# Patient Record
Sex: FEMALE | Race: WHITE | NOT HISPANIC OR LATINO | Employment: OTHER | URBAN - METROPOLITAN AREA
[De-identification: names, ages, dates, MRNs, and addresses within clinical notes are randomized per-mention and may not be internally consistent; named-entity substitution may affect disease eponyms.]

---

## 2021-08-20 ENCOUNTER — APPOINTMENT (OUTPATIENT)
Dept: RADIOLOGY | Facility: MEDICAL CENTER | Age: 76
End: 2021-08-20
Attending: EMERGENCY MEDICINE
Payer: MEDICARE

## 2021-08-20 ENCOUNTER — HOSPITAL ENCOUNTER (EMERGENCY)
Facility: MEDICAL CENTER | Age: 76
End: 2021-08-21
Attending: EMERGENCY MEDICINE
Payer: MEDICARE

## 2021-08-20 VITALS
OXYGEN SATURATION: 95 % | RESPIRATION RATE: 16 BRPM | SYSTOLIC BLOOD PRESSURE: 135 MMHG | WEIGHT: 142 LBS | BODY MASS INDEX: 23.66 KG/M2 | HEART RATE: 77 BPM | HEIGHT: 65 IN | TEMPERATURE: 98 F | DIASTOLIC BLOOD PRESSURE: 90 MMHG

## 2021-08-20 DIAGNOSIS — R19.00 PELVIC MASS IN FEMALE: ICD-10-CM

## 2021-08-20 DIAGNOSIS — R10.84 GENERALIZED ABDOMINAL PAIN: ICD-10-CM

## 2021-08-20 LAB
ALBUMIN SERPL BCP-MCNC: 4.1 G/DL (ref 3.2–4.9)
ALBUMIN/GLOB SERPL: 1.4 G/DL
ALP SERPL-CCNC: 105 U/L (ref 30–99)
ALT SERPL-CCNC: 11 U/L (ref 2–50)
ANION GAP SERPL CALC-SCNC: 13 MMOL/L (ref 7–16)
AST SERPL-CCNC: 15 U/L (ref 12–45)
BASOPHILS # BLD AUTO: 0.6 % (ref 0–1.8)
BASOPHILS # BLD: 0.09 K/UL (ref 0–0.12)
BILIRUB SERPL-MCNC: <0.2 MG/DL (ref 0.1–1.5)
BUN SERPL-MCNC: 15 MG/DL (ref 8–22)
CALCIUM SERPL-MCNC: 9.4 MG/DL (ref 8.5–10.5)
CHLORIDE SERPL-SCNC: 107 MMOL/L (ref 96–112)
CO2 SERPL-SCNC: 22 MMOL/L (ref 20–33)
CREAT SERPL-MCNC: 0.72 MG/DL (ref 0.5–1.4)
EOSINOPHIL # BLD AUTO: 0.39 K/UL (ref 0–0.51)
EOSINOPHIL NFR BLD: 2.7 % (ref 0–6.9)
ERYTHROCYTE [DISTWIDTH] IN BLOOD BY AUTOMATED COUNT: 50.2 FL (ref 35.9–50)
GLOBULIN SER CALC-MCNC: 3 G/DL (ref 1.9–3.5)
GLUCOSE SERPL-MCNC: 108 MG/DL (ref 65–99)
HCT VFR BLD AUTO: 43.6 % (ref 37–47)
HGB BLD-MCNC: 14.5 G/DL (ref 12–16)
IMM GRANULOCYTES # BLD AUTO: 0.1 K/UL (ref 0–0.11)
IMM GRANULOCYTES NFR BLD AUTO: 0.7 % (ref 0–0.9)
LIPASE SERPL-CCNC: 21 U/L (ref 11–82)
LYMPHOCYTES # BLD AUTO: 3.57 K/UL (ref 1–4.8)
LYMPHOCYTES NFR BLD: 24.9 % (ref 22–41)
MCH RBC QN AUTO: 31.7 PG (ref 27–33)
MCHC RBC AUTO-ENTMCNC: 33.3 G/DL (ref 33.6–35)
MCV RBC AUTO: 95.2 FL (ref 81.4–97.8)
MONOCYTES # BLD AUTO: 0.85 K/UL (ref 0–0.85)
MONOCYTES NFR BLD AUTO: 5.9 % (ref 0–13.4)
NEUTROPHILS # BLD AUTO: 9.34 K/UL (ref 2–7.15)
NEUTROPHILS NFR BLD: 65.2 % (ref 44–72)
NRBC # BLD AUTO: 0 K/UL
NRBC BLD-RTO: 0 /100 WBC
PLATELET # BLD AUTO: 333 K/UL (ref 164–446)
PMV BLD AUTO: 9.2 FL (ref 9–12.9)
POTASSIUM SERPL-SCNC: 3.5 MMOL/L (ref 3.6–5.5)
PROT SERPL-MCNC: 7.1 G/DL (ref 6–8.2)
RBC # BLD AUTO: 4.58 M/UL (ref 4.2–5.4)
SODIUM SERPL-SCNC: 142 MMOL/L (ref 135–145)
WBC # BLD AUTO: 14.3 K/UL (ref 4.8–10.8)

## 2021-08-20 PROCEDURE — 80053 COMPREHEN METABOLIC PANEL: CPT

## 2021-08-20 PROCEDURE — 99284 EMERGENCY DEPT VISIT MOD MDM: CPT

## 2021-08-20 PROCEDURE — 85025 COMPLETE CBC W/AUTO DIFF WBC: CPT

## 2021-08-20 PROCEDURE — 96372 THER/PROPH/DIAG INJ SC/IM: CPT

## 2021-08-20 PROCEDURE — 83690 ASSAY OF LIPASE: CPT

## 2021-08-20 RX ORDER — DICYCLOMINE HYDROCHLORIDE 10 MG/ML
20 INJECTION INTRAMUSCULAR ONCE
Status: COMPLETED | OUTPATIENT
Start: 2021-08-21 | End: 2021-08-21

## 2021-08-21 ENCOUNTER — APPOINTMENT (OUTPATIENT)
Dept: RADIOLOGY | Facility: MEDICAL CENTER | Age: 76
End: 2021-08-21
Attending: EMERGENCY MEDICINE
Payer: MEDICARE

## 2021-08-21 PROCEDURE — 74177 CT ABD & PELVIS W/CONTRAST: CPT

## 2021-08-21 PROCEDURE — 700117 HCHG RX CONTRAST REV CODE 255: Performed by: EMERGENCY MEDICINE

## 2021-08-21 PROCEDURE — 700111 HCHG RX REV CODE 636 W/ 250 OVERRIDE (IP): Performed by: EMERGENCY MEDICINE

## 2021-08-21 PROCEDURE — 96372 THER/PROPH/DIAG INJ SC/IM: CPT

## 2021-08-21 PROCEDURE — 700102 HCHG RX REV CODE 250 W/ 637 OVERRIDE(OP): Performed by: EMERGENCY MEDICINE

## 2021-08-21 PROCEDURE — A9270 NON-COVERED ITEM OR SERVICE: HCPCS | Performed by: EMERGENCY MEDICINE

## 2021-08-21 RX ORDER — AMOXICILLIN 250 MG
1 CAPSULE ORAL DAILY
Qty: 90 TABLET | Refills: 0 | Status: SHIPPED | OUTPATIENT
Start: 2021-08-21 | End: 2022-11-15

## 2021-08-21 RX ORDER — POLYETHYLENE GLYCOL 3350 17 G/17G
17 POWDER, FOR SOLUTION ORAL DAILY
Qty: 3 EACH | Refills: 0 | Status: SHIPPED | OUTPATIENT
Start: 2021-08-21 | End: 2021-08-24

## 2021-08-21 RX ADMIN — LIDOCAINE HYDROCHLORIDE 30 ML: 20 SOLUTION OROPHARYNGEAL at 01:59

## 2021-08-21 RX ADMIN — DICYCLOMINE HYDROCHLORIDE 20 MG: 20 INJECTION, SOLUTION INTRAMUSCULAR at 02:00

## 2021-08-21 RX ADMIN — IOHEXOL 100 ML: 350 INJECTION, SOLUTION INTRAVENOUS at 01:22

## 2021-08-21 NOTE — DISCHARGE INSTRUCTIONS
Your abdominal pain seems to be from constipation.  This can definitely cause pain after eating.  Use the medications that we have prescribed, and schedule follow-up with your primary care doctor to discuss your symptoms and this emergency department visit.    Although probably unrelated to your pain, there is a lump on your uterus or ovary on the left.  We have put a referral in our computer system to gynecology to further evaluate this lump.  Our schedulers will contact you to help make sure you have an appointment arranged.  You can also use the contact information listed here to schedule your own appointment and confirm your referral.

## 2021-08-21 NOTE — ED PROVIDER NOTES
ED Provider Note    Scribed for Omar Sky M.D. by Omar Sky M.D.. 8/20/2021,  11:15 PM.    CHIEF COMPLAINT  Chief Complaint   Patient presents with   • Abdominal Pain   • N/V       HPI  Remy Gallegos is a 76 y.o. female who presents to the Emergency Department for acute on chronic postprandial diffuse abdominal pain.  She actually denies nausea and vomiting usually, though vomited tonight.  She says that this pain has been happening intermittently for 2 years.  She has a primary care doctor who she last saw 4 months ago, but has never discussed this problem with her primary care doctor or seen a gastroenterologist or taken any medications at home for her symptoms.  She cannot identify any specific foods that trigger her symptoms.  She says that she sometimes feels better after she has a bowel movement.  She says that she can never tell when this pain is going to happen.  She reports that she was told at another hospital nearby that she had colitis.  This was about a month ago, and she was treated with antibiotics.  This treatment did not make her feel better, she does not think.  She has not had fevers or chills, chest pain or cough or shortness of breath.  She denies abdominal surgeries.  She occasionally takes tramadol, but no other medications.  She has normal vital signs, no obvious distress.      REVIEW OF SYSTEMS  See HPI for further details. All other systems are negative.     PAST MEDICAL HISTORY       SOCIAL HISTORY  Social History     Tobacco Use   • Smoking status: Current Every Day Smoker     Packs/day: 0.50   • Smokeless tobacco: Never Used   Substance and Sexual Activity   • Alcohol use: Never   • Drug use: Never   • Sexual activity: Not on file     Social History     Substance and Sexual Activity   Drug Use Never       SURGICAL HISTORY  patient denies any surgical history    CURRENT MEDICATIONS  Home Medications    **Home medications have not yet been reviewed for this  "encounter**         ALLERGIES  Not on File    PHYSICAL EXAM  VITAL SIGNS: /90   Pulse 77   Temp 36.7 °C (98 °F) (Temporal)   Resp 16   Ht 1.651 m (5' 5\")   Wt 64.4 kg (142 lb)   SpO2 95%   BMI 23.63 kg/m²   Pulse ox interpretation: I interpret this pulse ox as normal.  Constitutional: Alert in no apparent distress.  HENT: No signs of trauma, Bilateral external ears normal, Nose normal.   Eyes: Conjunctiva normal, Non-icteric.   Neck: Normal range of motion, Supple, No stridor.   Lymphatic: No lymphadenopathy noted.   Cardiovascular: Regular rate and rhythm, no murmurs.   Thorax & Lungs: Normal breath sounds, No respiratory distress, No wheezing, No chest tenderness.   Abdomen: Bowel sounds normal, Soft, No tenderness, No masses, No pulsatile masses. No peritoneal signs.  Skin: Warm, Dry, No erythema, No rash.   Extremities: Intact distal pulses, No edema, No cyanosis.  Musculoskeletal: Good range of motion in all major joints. No or major deformities noted.   Neurologic: Alert , Normal motor function, Normal sensory function, No focal deficits noted.   Psychiatric: Affect normal, Judgment normal, Mood normal.     DIAGNOSTIC STUDIES / PROCEDURES    LABS  Labs Reviewed   CBC WITH DIFFERENTIAL - Abnormal; Notable for the following components:       Result Value    WBC 14.3 (*)     MCHC 33.3 (*)     RDW 50.2 (*)     Neutrophils (Absolute) 9.34 (*)     All other components within normal limits   COMP METABOLIC PANEL - Abnormal; Notable for the following components:    Potassium 3.5 (*)     Glucose 108 (*)     Alkaline Phosphatase 105 (*)     All other components within normal limits   LIPASE   ESTIMATED GFR   URINALYSIS     All labs reviewed by me.    RADIOLOGY  CT-ABDOMEN-PELVIS WITH   Final Result      1.  Colonic diverticulosis without acute diverticulitis.   2.  No bowel obstruction or definite acute inflammation.   3.  Indeterminate left pelvic mass which could be uterine fibroid versus ovarian in " etiology. Pelvic ultrasound is recommended for further evaluation.   4.  Small hiatal hernia.        The radiologist's interpretation of all radiological studies have been reviewed by me.    COURSE & MEDICAL DECISION MAKING  Nursing notes, ARPITA LAMAHx reviewed in chart.     11:15 PM Patient seen and examined at bedside.  Labs ordered from triage have resulted, showing a moderate leukocytosis of 14.  I think it warrants a CT scan, given the severity and chronicity of the patient's symptoms.  Differential is obviously quite broad, though the duration of the symptoms of the past couple of years makes infection somewhat less likely, the patient does say things have been worse recently.  I do not suspect urinary tract infection. Ordered for CT of the abdomen and pelvis with contrast to evaluate. Patient will be treated with a GI cocktail and Bentyl for her symptoms.     1:30 AM I returned to the bedside after the patient's CT scan, which was ordered when her blood work showed a leukocytosis.  She has been resting comfortably.  We discussed her pelvic mass, which she says she knows about, but has not followed up with yet.  This seems unlikely to be causing her diffuse postprandial lower abdominal pain, and there is significant evidence of constipation.  The patient will be treated with a bowel regimen, but have also referred her to GYN, and she agrees to schedule a follow-up.  She has contact information and I placed a referral in epic.  We discussed return precautions as well.     The patient will return for new or worsening symptoms and is stable at the time of discharge.    The patient is referred to a primary physician for blood pressure management, diabetic screening, and for all other preventative health concerns.    DISPOSITION:  Patient will be discharged home in stable condition.    FOLLOW UP:  [facility] [address]  [facility] [id number]  [phone]  Schedule an  appointment as soon as possible for a visit         OUTPATIENT MEDICATIONS:  Discharge Medication List as of 8/21/2021  2:37 AM      START taking these medications    Details   polyethylene glycol/lytes (MIRALAX) 17 g Pack Take 1 Packet by mouth every day for 3 days., Disp-3 Each, R-0, Print Rx Paper      senna-docusate (PERICOLACE OR SENOKOT S) 8.6-50 MG Tab Take 1 Tablet by mouth every day., Disp-90 Tablet, R-0, Print Rx Paper               FINAL IMPRESSION  1. Generalized abdominal pain    2. Pelvic mass in female

## 2022-08-22 ENCOUNTER — APPOINTMENT (OUTPATIENT)
Dept: OBGYN | Facility: CLINIC | Age: 77
End: 2022-08-22
Payer: MEDICARE

## 2022-09-28 NOTE — PROGRESS NOTES
Urogynecology and Pelvic Reconstructive Surgery Consultation Visit    Remy Gallegos MRN:[id number]    Referred by: Tan Beckett MD    Reason for Visit:   Chief Complaint   Patient presents with    New Patient     Consult          Subjective     History of Presenting Illness:    Ms.Elanna Gallegos is a 77 y.o. year old P3 who was referred by her urologist Dr. Beckett for the evaluation and management of prolapse.     She is bothered by prolapse, difficulty emptying her bladder.  She said prolapse for 5 to 6 years which is slowly worsening.  She is interested in definitive management at this time and has been told about pessary, which she would prefer to avoid.    She also has a history of significant vaginal irritation and itching, with no prior treatments.  She also notes vaginal discharge, not foul-smelling.    History noted that on CT imaging from 2021 a left exophytic uterine fibroid versus ovarian mass was noted.  No follow-up imaging is yet been performed.     Prior Pelvic surgery:   Tubal ligation     Prior treatment:   None       Pelvic floor symptom review:     Bladder:   Voids per day: 10+ Voids per night: 2      Urinary incontinence episodes per day: none   Bladder emptying: Incomplete   Voiding symptoms: Strain-Push to Void and Double or Triple Voiding   UTI in last 12 months: 2 in last year   Other urologic history:       Prolapse:     Prolapse symptoms: Bulge   Degree of prolapse: Beyond Introitus   Duration of prolapse symptoms: 5-6, worsening      Bowel:    Constipation: No  - h/o colitis   Bowel movements per day: varies    Straining to empty bowels: No    Splinting to evacuate: No    Painful evacuation: No    Difficulty emptying rectum: No    Incontinence to stool: No   Incontinence to gas: No     Blood in stool: no   Hemorrhoids: No    Bowel conditions: colitis, unspecified   Most recent colonoscopy: due for colonoscopy in 3 weeks for recurrent colitis       Sexual function:  "   Sexually active: No  - not since 49    Pain with intercourse: No       Pelvic Pain: No      Past medical and surgical history    Past obstetric history   Number of vaginal deliveries: 3   Number of  deliveries: 0    Past gynecological history:    Last menstrual period: postmenopausal, no PMB   History of endometrial polyps:  No    History of endometriosis: No    History of cervical dysplasia: No     Last pap: 63yo       Past medical history:  Past Medical History:   Diagnosis Date    Hyperlipidemia      Past surgical history:  Past Surgical History:   Procedure Laterality Date    TUBAL COAGULATION LAPAROSCOPIC BILATERAL       Medications:has a current medication list which includes the following prescription(s): atorvastatin, tramadol hcl, estradiol, clobetasol, and senna-docusate.  Allergies:Patient has no allergy information on record.  Family history:History reviewed. No pertinent family history.  Social history: reports that she has been smoking cigarettes. She has been smoking an average of .5 packs per day. She has never used smokeless tobacco. She reports that she does not drink alcohol and does not use drugs.    Review of systems: A full review of systems was performed, and negative with the exception of want is noted above in the HPI.        Objective        /84 (BP Location: Left arm, Patient Position: Sitting, BP Cuff Size: Adult)   Pulse 90   Ht 5' 5\"   Wt 130 lb   BMI 21.63 kg/m²     Physical Exam  Vitals reviewed. Exam conducted with a chaperone present (MA - see notes.).   Constitutional:       Appearance: Normal appearance.   HENT:      Head: Normocephalic.      Mouth/Throat:      Mouth: Mucous membranes are moist.   Cardiovascular:      Rate and Rhythm: Normal rate.   Pulmonary:      Effort: Pulmonary effort is normal.   Abdominal:      Palpations: Abdomen is soft. There is no mass.      Tenderness: There is no abdominal tenderness.   Skin:     General: Skin is warm and dry. "   Neurological:      Mental Status: She is alert.   Psychiatric:         Mood and Affect: Mood normal.       Genitourinary:    External female genitalia: WNL   Vulva: Lichen Sclerosis changes with lacy reticular pattern in a figure-of-eight around the vulva and anus.   Bulbocavernosus reflex: Intact   Anal wink reflex: Intact   Perineal sensation: WNL   Urethra: Caruncle   Vagina: Atrophic   Atrophy: Severe   Cough stress test: Negative; positive with apex reduced     Pelvic floor:    POP-Q: Aa -1.5 / Ba -1.5 / TVL 9 / C +3.5 / D -3 / Ap -2 / Bp -2 / GH 3.5 / PB 2 / Aa/Ba -1.5 with apex reduced    Prolapse stage: 3   Paravaginal defect: none   Cervical elongation: Yes   Cervix: small polyp at os, otherwise normal appearing. Pap collected   Urethral tenderness: No    Bladder/ suprapubic tenderness: No    Levator tenderness: None   Levator muscle tone: WNL   Pelvic floor contraction strength (modified Oxford scale): 2=Weak   Pelvic floor contraction duration: Brief    Bimanual exam: Small, Mobile Uterus with firm mobile and well-circumscribed adnexal mass on the left, unable to determine if connected to uterus or separate adnexal structure. Cervical elongation.     Procedure Performed: Cervical Pap    Diagnostic test and records review:      Post-void residual: 130mL, performed by Bladder Scanner    Labs:     Radiology:     CT A/P 8/2021: Images reviewed, appears to be pedunculated fibroid, however unclear based on imaging modality.  Recommend further work-up    Pelvis: There is a mass within the pelvis on the left possibly a exophytic uterine fibroid versus ovarian mass. Pelvic ultrasound to suggested to further evaluate. It measures approximately 3.6 x 3.2 x 4.8 cm. Bladder is decompressed.  IMPRESSION:  1.  Colonic diverticulosis without acute diverticulitis.  2.  No bowel obstruction or definite acute inflammation.  3.  Indeterminate left pelvic mass which could be uterine fibroid versus ovarian in etiology.  Pelvic ultrasound is recommended for further evaluation.  4.  Small hiatal hernia.    Documentation reviewed: Prior EMR Records    Outside records reviewed: 3 pages           Assessment & Plan     Ms.Elanna Gallegos is a 77 y.o. year old P3 with significant uterine prolapse with cervical elongation, lichen sclerosis, atrophy, overactive bladder. We discussed my recommendations for further diagnosis and treatment at length today.     1. Incomplete uterovaginal prolapse  2. Cervical hypertrophic elongation  3. Cystocele, midline  4. Incomplete bladder emptying  Ms. Gallegos has symptomatic pelvic organ prolapse, uterine/cervical predominant. I reviewed the clinical findings and discussed the pathogenesis extensively, including genetic tendency, aging, menopause and childbirth injuries. Also discussed options for management, including both nonsurgical and surgical options.  Prolapse does not require urgent management as it is not a dangerous condition at this stage.  Her prolapse is likely contributing to her incomplete emptying as well.  Nonsurgical options include both expectant management and pessary use. I discussed different types of pessary as well as pessary care. The patient can be fitted with a pessary device in the office by a physician and the pessary can either be removed regularly by the patient or left in place, returning to the office every 3 to 6 months for evaluation. She does not desire trial of pessary  Surgical options include vaginal and abdominal reconstructive approaches, as well as obliterative approaches which close the vaginal canal.  Given the predominance of her uterine prolapse as well as significant cervical elongation, I strongly recommend a hysterectomy as a part of her prolapse repair.  Usually, this could be performed either vaginally or laparoscopically, however due to the unclear nature of her uterine fibroid versus ovarian mass, a laparoscopic approach would allow full visualization  of the adnexa to rule out pathology, as well as a significantly high apical suspension.  While she is not sexually active and has no interest in future sexual activity, she has decent anterior and posterior wall support which would make my ability to perform a procedure such as a colpectomy more difficult.  She also move forward with booking: Robotic assisted laparoscopic total hysterectomy, bilateral salpingo-oophorectomy, vault suspension, possible anterior/posterior repair/perineorrhaphy, possible incontinence procedure, cystourethroscopy, and all indicated procedures.  Detailed verbal as well as written counseling was provided and printed for her for review prior to her preoperative visit.  This reviewed the risk, benefits, and alternatives of the procedure overall.  On exam today she had significant stress urinary incontinence with prolapse reduction.  However she also has incomplete bladder emptying.  Due to these combined factors I recommend preoperative urodynamic testing to evaluate whether it is safe to perform a concomitant mid urethral sling to prevent new urinary incontinence, or if she has other causes for voiding dysfunction including detrusor underactivity not related to prolapse.      5. Adnexal mass  I reviewed the images of her CT, and while it does appear this could be an exophytic fibroid versus an ovarian mass, in her age group further imaging is required to further delineate, as CT is not the optimal imaging for the pelvis or adnexal structures.  I strongly recommend she get a pelvic ultrasound now to further evaluate this mass for surgical planning and to rule out any other worrisome pathology.  If any concerning adnexal findings I would recommend work-up with a Ca125, and if elevated potential consultation with gynecologic oncology.  - US-PELVIC COMPLETE (TRANSABDOMINAL/TRANSVAGINAL) (COMBO); Future    6. Atrophic vaginitis, frequent UTI  She has had 2 UTIs in the past year as well as  vaginal discomfort and discharge. Her exam confirms severe vaginal atrophy / genitorurinary syndrome of menopause. This is very common and due to low estrogen levels, which render the vaginal tissue thin, irritated, and open to colonization with gut bossman. This can lead to irritation, dryness, painful sex, urinary infections and urinary urgency. Discussed risks, benefits, and indications for vaginal estrogen therapy.  Vaginal estrogen has negligible absorption into the bloodstream and is not associated with increased risks for uterine or breast cancers. Prescription given for estrace vaginal cream to be placed inside vagina nightly for 2 weeks, then twice weekly thereafter. The effects of vaginal estrogen can take weeks to months.    - estradiol (ESTRACE VAGINAL) 0.1 MG/GM vaginal cream; Apply 1g cream inside vagina using applicator nightly for 2 weeks, then twice per week thereafter  Dispense: 1 Each; Refill: 3    7. Lichen sclerosus  She has classic findings of lichen sclerosis, which correlates with her significant vulvar itching.  I recommend a 3-month course of clobetasol treatment, twice daily to the external area of the vagina.  Application of this, and clarification of how this is different from the application of estrogen (above), was reviewed with her.  She is counseled that hopefully if symptoms resolve and tissue improved after clobetasol treatment that we can cease therapy.  If anything worsens or changes she may require vulvar biopsy in the future.  - clobetasol (TEMOVATE) 0.05 % Cream; Use pea sized amount on finger and apply to outside of vulva, and skin around anus. Use twice per day for 3 months.  Dispense: 1 Each; Refill: 0    8. Cervical polyp  9. Cervical cancer screening  Small, benign-appearing polyp seen on examination today.  This is most likely due to reaction from rubbing against clothing due to prolapse, however I would prefer to rule out any abnormal cervical pathology prior to  proceeding with any surgical management.  She has no history of abnormal Paps, and she is not sexually active.  Pap collected today, follow-up result.  - THINPREP PAP W/HPV ; Future    10. OAB (overactive bladder)  11. Nocturia  She has significant urinary frequency in combination with her incomplete bladder emptying and nocturia, but no leakage unless prolapse reduced.   -Evaluate at time of preoperative urodynamic testing.                 Alex John MD, FACOG    Female Pelvic Medicine and Reconstructive Surgery  Department of Obstetrics and Gynecology  [facility]    CC: Dr. Beckett    This medical record contains text that has been entered with the assistance of computer voice recognition and dictation software.  Therefore, it may contain unintended errors in text, spelling, punctuation, or grammar

## 2022-09-29 ENCOUNTER — HOSPITAL ENCOUNTER (OUTPATIENT)
Facility: MEDICAL CENTER | Age: 77
End: 2022-09-29
Attending: STUDENT IN AN ORGANIZED HEALTH CARE EDUCATION/TRAINING PROGRAM
Payer: MEDICARE

## 2022-09-29 ENCOUNTER — GYNECOLOGY VISIT (OUTPATIENT)
Dept: OBGYN | Facility: CLINIC | Age: 77
End: 2022-09-29
Payer: MEDICARE

## 2022-09-29 VITALS
WEIGHT: 130 LBS | HEART RATE: 90 BPM | DIASTOLIC BLOOD PRESSURE: 84 MMHG | HEIGHT: 65 IN | SYSTOLIC BLOOD PRESSURE: 138 MMHG | BODY MASS INDEX: 21.66 KG/M2

## 2022-09-29 DIAGNOSIS — N81.2 INCOMPLETE UTEROVAGINAL PROLAPSE: ICD-10-CM

## 2022-09-29 DIAGNOSIS — R33.9 INCOMPLETE BLADDER EMPTYING: ICD-10-CM

## 2022-09-29 DIAGNOSIS — R35.1 NOCTURIA: ICD-10-CM

## 2022-09-29 DIAGNOSIS — N32.81 OAB (OVERACTIVE BLADDER): ICD-10-CM

## 2022-09-29 DIAGNOSIS — N95.2 ATROPHIC VAGINITIS: ICD-10-CM

## 2022-09-29 DIAGNOSIS — N81.11 CYSTOCELE, MIDLINE: ICD-10-CM

## 2022-09-29 DIAGNOSIS — Z12.4 CERVICAL CANCER SCREENING: ICD-10-CM

## 2022-09-29 DIAGNOSIS — N88.4 CERVICAL HYPERTROPHIC ELONGATION: ICD-10-CM

## 2022-09-29 DIAGNOSIS — N84.1 CERVICAL POLYP: ICD-10-CM

## 2022-09-29 DIAGNOSIS — N94.89 ADNEXAL MASS: ICD-10-CM

## 2022-09-29 DIAGNOSIS — L90.0 LICHEN SCLEROSUS: ICD-10-CM

## 2022-09-29 PROCEDURE — 88175 CYTOPATH C/V AUTO FLUID REDO: CPT

## 2022-09-29 PROCEDURE — 99205 OFFICE O/P NEW HI 60 MIN: CPT | Performed by: STUDENT IN AN ORGANIZED HEALTH CARE EDUCATION/TRAINING PROGRAM

## 2022-09-29 PROCEDURE — 87624 HPV HI-RISK TYP POOLED RSLT: CPT

## 2022-09-29 RX ORDER — TRAMADOL HYDROCHLORIDE 100 MG/1
TABLET, COATED ORAL
COMMUNITY

## 2022-09-29 RX ORDER — ATORVASTATIN CALCIUM 10 MG/1
TABLET, FILM COATED ORAL NIGHTLY
COMMUNITY

## 2022-09-29 RX ORDER — CLOBETASOL PROPIONATE 0.5 MG/G
CREAM TOPICAL
Qty: 1 EACH | Refills: 0 | Status: SHIPPED | OUTPATIENT
Start: 2022-09-29 | End: 2023-04-14 | Stop reason: SDUPTHER

## 2022-09-29 RX ORDER — ESTRADIOL 0.1 MG/G
CREAM VAGINAL
Qty: 1 EACH | Refills: 3 | Status: SHIPPED | OUTPATIENT
Start: 2022-09-29 | End: 2022-11-15

## 2022-09-29 ASSESSMENT — FIBROSIS 4 INDEX: FIB4 SCORE: 1.06

## 2022-09-29 NOTE — PROGRESS NOTES
PT here today for consult   PT States prolapse and pelvic pain.   Hysterectomy? X  Good #: [phone] (home)   PVR : 130 mL  Last voided about an hour ago  Pharmacy Verified

## 2022-09-29 NOTE — PATIENT INSTRUCTIONS
Pre-op: What you should know before your surgery  Laparoscopic/robotic reconstructive surgery for prolapse   (native tissue)        What you should know before your surgery     You are scheduled for surgery to fix your prolapse (vaginal bulge) using sutures to suspend your own tissues back into a supported position. These surgeries are minimally-invasive, using only small “keyhole” cuts on the abdomen and in the vagina. The documents in this packet will outline each part of the surgery. There may be a few “possible” surgeries listed. We use the word “possible” because we tailor the surgery based on your needs. This means we won't know how much surgery you'll need until after you receive anesthesia and fall asleep.     When you fall asleep, the pelvic muscles will relax, allowing us to determine how much surgery you need. In general, we will do as few procedures as possible to fix your prolapse but address each area as needed.    Goals of prolapse surgery:   The primary goal of surgery is to repair the prolapse and eliminate the symptoms of a vaginal bulge and pressure. Depending on your symptoms, the surgery may possibly improve bladder and/or rectal emptying, as well as urinary incontinence.      Prolapse recurrence:  No permanent mesh material will be used to fix prolapse in this procedure. However, since we are relying on your own tissues to heal into place and correct the prolapse, there is a risk that your symptoms may return over time. The majority of patients are satisfied with their repair long-term and do not require any further surgery. However, after non-mesh vaginal surgery, prolapse can eventually return in up to half of women.  About 10% of women require another treatment.  Your personal risk of recurrence/retreatment is based on multiple factors including genetics, your body weight, smoking, frequent heavy lifting, and future vaginal deliveries.     Sexual function:  Following surgical repair, most  patients experience improvements in their sexual function. Surgery tends to improve the general discomfort of sex associated with prolapse. However, if you have a long history of pain with sex (either externally or internally), this is unlikely to be due to prolapse. Therefore, may not improve these symptoms.     Surgery involves the cutting and re-sewing of the vaginal tissues. Scar tissue may form after surgery, which can lead to painful sex for some patients. In many patients, this new pain goes away over time or can be improved with pelvic physical therapy, lubrication, dilators, or vaginal estrogen.       Bladder urgency and incontinence after surgery:  Bladder tests may be performed before your surgery to see whether you are at risk for new or worsening urinary leakage after prolapse repair. Our bladder testing is a great tool to find out who is at risk for urinary leakage, but it is not perfect. There is a chance you may have new or increased leakage with coughing, sneezing, or laughing after surgery. Problems with leakage can be addressed in a number of ways. Bladder urgency and/or frequency often improves after surgery, but it may worsen in some patients. We have various medications and therapies that can help with this urgency after surgery, if necessary.     Risk of postoperative pain:   Pain after prolapse surgery is a normal part of the healing process, but usually well-tolerated. Common areas of pain include the pelvis, buttocks, hips and back, often related to positioning. Try changing your position when sitting or resting in bed to relieve the pain.     Expect to have some pain when you are discharged home. This should improve with time and with use of medications. See “after surgery” instructions for more details on pain control.     General risks of pelvic reconstructive surgery: Specific risks for your procedure are discussed separately  Anesthesia: With modern anesthetics and monitoring  equipment, complications due to anesthesia are very rare. Your anesthesiologist will discuss what will be most suitable for you on the day of surgery  Bleeding: All surgery results in bleeding, however this surgery usually amounts to blood loss between a tablespoon and a teacup. Large amounts of blood loss that requires a blood transfusion are not common (only 1-2% of women) in prolapse surgery.  Blood transfusion, if needed, is safe. Minor reactions like fever or allergy occur in less than 1% of transfusions. Riskan infection or mismatched blood is very rare (less than 1 out of every 100,000 transfusions).   Infection: The most common infection with prolapse surgery is a urinary tract infection (UTI). This is easily treated. Wound infections occur in 2-3% of patients. Rarely, infection of the abdominal/vaginal wounds may occur, or abscesses in the pelvis may develop. These infections may need to be treated with antibiotics or another procedure to fix them. Risk factors for infections and wound complications include diabetes, smoking, obesity, and other chronic illnesses.  Blood Clots: Clots in the blood vessels of the legs and lungs are potentially dangerous and can occur in patients undergoing prolapse surgery. This is prevented with leg compression during surgery, and sometimes, an injected blood thinner. We strongly encourage you to stay mobile because this is an important way to prevent clots.  Damage to surrounding organs. The pelvic organs are all very close together. The risk of damage to other organs increases if you have had prior pelvic surgeries, as well as a history of endometriosis or pelvic infection. These conditions can cause scar tissue to develop in the pelvis. Scar tissue can cause organs to stick together, making the surgery more difficult.    Ureter and bladder damage: The ureters are tubes that carry urine from the kidneys to the bladder. They travel very close to the uterus and vagina. The  bladder is attached to both your uterus and the front of the vagina. Damage/injury can happen during prolapse-repair procedures. A cystoscopy (camera in the bladder) will be done during your surgery to ensure there is no bladder or ureter damage/injury. If injury occurs, it may require temporary placement of a stent (drainage tube). In rare cases, a larger abdominal incision may be needed to repair the injury. A bladder catheter may need to stay in place temporarily after surgery.  Bowel damage: Bowel damage/injury is uncommon during your surgery. Any damage that is seen in surgery will be fixed. Very rarely, a temporary ostomy (connection of bowel to the front of the abdomen and collection of stool with a bag) is required for a higher-risk bowel injury.  Vascular damage: Major damage to blood vessels is uncommon. If this occurs, it may require a larger surgery and/or blood transfusion.  Fistula: A fistula is an abnormal connection between the vagina and either the bladder or rectum. A fistula leads to leakage of urine or stool. These are rare complications that arise during healing from pelvic surgery that sometimes require additional surgeries to correct. We take great care is during your surgery to prevent these fistulas. If they do occur, your Urogynecologist is the leading expert in fixing them.     Main Procedure:    Laparoscopic/robotic total hysterectomy, uterosacral suspension   (removal of entire uterus and suspension of the vagina)      Once you are asleep, small “keyhole” incisions (smaller than ½ inch) will be made in the abdomen in order to place our instruments. Your surgeon may use a robotic system to help complete the surgery through the small incisions. Your uterus, cervix, and fallopian tubes will then be removed. This is called a total hysterectomy and salpingectomy. If you have decided to also remove the ovaries (oophorectomy), this will also be performed. Then, sutures will be used to shorten  "the uterosacral ligaments, lifting up the top of the vagina to its normal position.     We will then look into your bladder with a camera to make sure that no damage was done, and that your ureters (the tubes that carry urine from the kidneys to the bladder) are working. It should be noted that if you have not yet gone through menopause, once the uterus is removed you will no longer get your period, and you can no longer become pregnant or have children without a surrogate.          After this procedure is complete, you will be re-examined and then proceed with the following possible procedures.     Main surgical procedure:  Posterior repair, perineorrhaphy  (fix prolapse of the back of the vagina/rectum and pelvic muscles)        The entire procedure will be completed in a minimally invasive manner, with all incisions inside of the vagina. Once you are asleep, a thorough exam will be performed to confirm the areas needing repair. A cut is made along the back wall of the vagina where the rectum is pushing down, and the skin above the rectum is  from the underlying supportive tissue. This stronger tissue underneath is then repaired using sutures that will dissolve over time. Sometimes excess skin is removed. The skin is then closed.     If the area between the vagina and the anus (called the perineum) is weak, then sutures will be placed to make that area stronger. This is called a \"perineorrhaphy.\" This will be just like a repair of an episiotomy or a vaginal tear after having a baby.     The risk of these procedures is similar to those mentioned in the “pre-op” leaflet. However, any surgery to the back wall of the vagina carries a higher risk of pain with sexual intercourse after surgery (up to 10%) due to scar formation. Great care is taken not to narrow the vagina more than necessary. The perineorrhaphy (or episiotomy type of repair) can be uncomfortable and may be difficult to sit normally for up to 6 " weeks after surgery. You may use a doughnut cushion to sit on if needed.          Post-op: What to expect after your surgery    For urgent post-operative questions or concerns, please call Dr. John's direct on-call cell line at [phone].     For less-urgent matters (Monday - Friday), you may send a message through [employer] or call the general Women's Health line at [phone] [phone].     Recovery room  You can expect to stay in the recovery room for a few hours until you are alert. There may be a gauze packing in your vagina, which will be removed before you go home. Pain is normal after surgery but should be tolerable. Your pain will become less over the first 2 weeks. If your pain is difficult to control or you need more nursing care, you will stay in the hospital overnight. Most patients do very well going home on the day of surgery.     Bladder function  You will wake up with a small tube (catheter) in your bladder. A bladder test, called a “void trial”, will be performed by the nurse before you go home. The test is done to make sure you can empty your bladder normally. To do the bladder test, the nurse will fill your bladder with sterile water, remove the catheter, and give you 30 minutes to try and urinate (pee) on your own. If you cannot urinate, or if you only urinate a small amount, you will need to:   Go home with a catheter that stays in your bladder OR  Learn to put a catheter into your bladder a few times a day to empty it    Use of a catheter is temporary and usually needed for 2-4 days. It is very common for the bladder to work slowly, or sluggishly, after this type of surgery. One in every 3-4 patients will require some type of catheterization. An antibiotic may be prescribed while the catheter is in place to decrease the risk of infection.    Call the surgeon for treatment, if you have signs and symptoms of a urinary tract infection, including:  Burning with urination  Bladder pain  Worsening need  to urinate right away,  Urine with a bad smell    Vaginal care  Do not go swimming, take sitting baths, and have sexual intercourse for 6 weeks after surgery Do not place anything in your vagina except vaginal estrogen cream, if instructed to do so by your surgeon.   Vaginal discharge and bleeding/spotting is also normal through the entire 6-week recovery. Sometimes small sutures will fall out of the vagina as they dissolve. This is normal. Contact the office with any heavy bleeding, foul discharge or worsening pain.. Contact us with any heavy bleeding, foul discharge or worsening pain.     Pain management  Surgical pain is controlled in most patients with only acetaminophen (Tylenol) and non-steroidal anti-inflammatory drugs (NSAIDs), such as ibuprofen (Advil). These drugs can be taken together because they do not interact with each other. Check your prescription for specific dosing instructions. A cold compress can help with pain in the vaginal area.  Sometimes, a short course of narcotics, such as oxycodone or hydromorphone is required. We do not recommend using narcotics regularly as it can lead to constipation or dizziness and falls. Do not drive or operate heavy machinery while using narcotics. You are unlikely to become addicted if you need to take a narcotic medication a few times within the first week of your surgery.  After the first few days to one week, your pain should decrease and you should not have pain severe enough to need narcotics. If you continue having severe pain, contact your surgeon for re-evaluation.     Abdominal wound care  The incisions on your abdomen will be closed with either small bandages or a surgical glue. There are also tiny dissolving sutures beneath the skin. You can shower with these in place. In shower, let the soapy warm water run over you incisions. Do not scrub or wipe you incisions. The bandages will fall off on their own, or you can remove them after at least 3 days if  they become discolored or dirty. The glue will also fall off on its own after a few weeks. Small sutures that pop out through the skin are normal and will dissolve over time. Contact us if you feel increasing pain or warmth at the incision. Also, call if you see increased redness or discharge (pus) at the incision.      Bowel function  Constipation is common after surgery, and it sometimes take several days before having a normal bowel movement. It is important to use a bowel regimen to keep your stools soft and avoid straining with bowel movements, which may damage the prolapse repair before it has healed. Most patients will be given a prescription for a stool softener (docusate) as well as a gentle laxative (Miralax or lactulose), which adds water to the stool to make it easier to pass. Take these daily throughout your recovery, and hold for a day if you develop diarrhea. Please call us if you experience any repeated episodes of vomiting, worsening abdominal pain/bloating, or are unable to have a bowel movement for more than 3 days.     Activity restrictions  During the first 6 weeks you should avoid any type of heavy lifting.  Gentle walking is good exercise. Start with about 10 minutes a day when you feel ready and build up gradually. Avoid repetitive squatting or bending at the waist. Avoid any fitness-type training, aerobics, etc. for at least 6 weeks after surgery. Listen to your body during the recovery period, and increase activity when you feel comfortable. Generally, you will need 4-6 weeks off work. This period may be longer if you have a very physical job.    Return to sex  When your surgeon clears you to resume sex (if desired), you should start out slowly and to use adequate lubrication. Your vagina and pelvis have been re-structured, and it can take time to get used to sex after surgery. Most scar tissue softens over time and discomfort improves. If discomfort does not go away, contact us to discuss  to schedule an exam and to discuss further options. In some cases, your surgeon may prescribe a low dose of vaginal estrogen to help with vaginal dryness and pain that may happen with sex.

## 2022-09-30 DIAGNOSIS — Z12.4 CERVICAL CANCER SCREENING: ICD-10-CM

## 2022-09-30 DIAGNOSIS — N84.1 CERVICAL POLYP: ICD-10-CM

## 2022-10-01 LAB
CYTOLOGY REG CYTOL: NORMAL
HPV HR 12 DNA CVX QL NAA+PROBE: NEGATIVE
HPV16 DNA SPEC QL NAA+PROBE: NEGATIVE
HPV18 DNA SPEC QL NAA+PROBE: NEGATIVE
SPECIMEN SOURCE: NORMAL

## 2022-11-04 ENCOUNTER — TELEPHONE (OUTPATIENT)
Dept: OBGYN | Facility: CLINIC | Age: 77
End: 2022-11-04
Payer: MEDICARE

## 2022-11-04 NOTE — TELEPHONE ENCOUNTER
Contacted pharmacy after receiving call from patient stating she has been unable to get her Clobetasol as the pharmacy states they never received a prescription.   Spoke with Juan Francisco from pharmacy and verbally called in Clobetasol 0.05% cream, Dispense quantity 1, 0 refills, Use pea sized amount on finger and apply to outside of vulva, and skin around anus. Use twice per day for three months.   Patient notified.

## 2022-11-15 ENCOUNTER — GYNECOLOGY VISIT (OUTPATIENT)
Dept: OBGYN | Facility: CLINIC | Age: 77
End: 2022-11-15
Payer: MEDICARE

## 2022-11-15 ENCOUNTER — PRE-ADMISSION TESTING (OUTPATIENT)
Dept: ADMISSIONS | Facility: MEDICAL CENTER | Age: 77
End: 2022-11-15
Attending: STUDENT IN AN ORGANIZED HEALTH CARE EDUCATION/TRAINING PROGRAM
Payer: MEDICARE

## 2022-11-15 VITALS
BODY MASS INDEX: 21.13 KG/M2 | DIASTOLIC BLOOD PRESSURE: 85 MMHG | HEART RATE: 74 BPM | SYSTOLIC BLOOD PRESSURE: 148 MMHG | WEIGHT: 127 LBS

## 2022-11-15 DIAGNOSIS — Z01.812 PRE-OPERATIVE LABORATORY EXAMINATION: ICD-10-CM

## 2022-11-15 DIAGNOSIS — D49.59 OVARIAN NEOPLASM: Primary | ICD-10-CM

## 2022-11-15 DIAGNOSIS — Z01.810 PRE-OPERATIVE CARDIOVASCULAR EXAMINATION: ICD-10-CM

## 2022-11-15 DIAGNOSIS — N39.8 VOIDING DYSFUNCTION: ICD-10-CM

## 2022-11-15 LAB
ANION GAP SERPL CALC-SCNC: 10 MMOL/L (ref 7–16)
APPEARANCE UR: CLEAR
BASOPHILS # BLD AUTO: 0.8 % (ref 0–1.8)
BASOPHILS # BLD: 0.06 K/UL (ref 0–0.12)
BILIRUB UR STRIP-MCNC: NORMAL MG/DL
BUN SERPL-MCNC: 13 MG/DL (ref 8–22)
CALCIUM SERPL-MCNC: 10.7 MG/DL (ref 8.5–10.5)
CHLORIDE SERPL-SCNC: 106 MMOL/L (ref 96–112)
CO2 SERPL-SCNC: 26 MMOL/L (ref 20–33)
COLOR UR AUTO: YELLOW
CREAT SERPL-MCNC: 0.59 MG/DL (ref 0.5–1.4)
EKG IMPRESSION: NORMAL
EOSINOPHIL # BLD AUTO: 0.23 K/UL (ref 0–0.51)
EOSINOPHIL NFR BLD: 2.9 % (ref 0–6.9)
ERYTHROCYTE [DISTWIDTH] IN BLOOD BY AUTOMATED COUNT: 53.1 FL (ref 35.9–50)
GFR SERPLBLD CREATININE-BSD FMLA CKD-EPI: 92 ML/MIN/1.73 M 2
GLUCOSE SERPL-MCNC: 95 MG/DL (ref 65–99)
GLUCOSE UR STRIP.AUTO-MCNC: NEGATIVE MG/DL
HCT VFR BLD AUTO: 42.7 % (ref 37–47)
HGB BLD-MCNC: 14 G/DL (ref 12–16)
IMM GRANULOCYTES # BLD AUTO: 0.03 K/UL (ref 0–0.11)
IMM GRANULOCYTES NFR BLD AUTO: 0.4 % (ref 0–0.9)
KETONES UR STRIP.AUTO-MCNC: NEGATIVE MG/DL
LEUKOCYTE ESTERASE UR QL STRIP.AUTO: NORMAL
LYMPHOCYTES # BLD AUTO: 2.75 K/UL (ref 1–4.8)
LYMPHOCYTES NFR BLD: 34.4 % (ref 22–41)
MCH RBC QN AUTO: 31.7 PG (ref 27–33)
MCHC RBC AUTO-ENTMCNC: 32.8 G/DL (ref 33.6–35)
MCV RBC AUTO: 96.6 FL (ref 81.4–97.8)
MONOCYTES # BLD AUTO: 0.62 K/UL (ref 0–0.85)
MONOCYTES NFR BLD AUTO: 7.8 % (ref 0–13.4)
NEUTROPHILS # BLD AUTO: 4.3 K/UL (ref 2–7.15)
NEUTROPHILS NFR BLD: 53.7 % (ref 44–72)
NITRITE UR QL STRIP.AUTO: NEGATIVE
NRBC # BLD AUTO: 0 K/UL
NRBC BLD-RTO: 0 /100 WBC
PH UR STRIP.AUTO: 6 [PH] (ref 5–8)
PLATELET # BLD AUTO: 368 K/UL (ref 164–446)
PMV BLD AUTO: 9.4 FL (ref 9–12.9)
POTASSIUM SERPL-SCNC: 4.9 MMOL/L (ref 3.6–5.5)
PROT UR QL STRIP: NEGATIVE MG/DL
RBC # BLD AUTO: 4.42 M/UL (ref 4.2–5.4)
RBC UR QL AUTO: NEGATIVE
SODIUM SERPL-SCNC: 142 MMOL/L (ref 135–145)
SP GR UR STRIP.AUTO: 1.01
UROBILINOGEN UR STRIP-MCNC: NORMAL MG/DL
WBC # BLD AUTO: 8 K/UL (ref 4.8–10.8)

## 2022-11-15 PROCEDURE — 85025 COMPLETE CBC W/AUTO DIFF WBC: CPT

## 2022-11-15 PROCEDURE — 93010 ELECTROCARDIOGRAM REPORT: CPT | Performed by: INTERNAL MEDICINE

## 2022-11-15 PROCEDURE — 80048 BASIC METABOLIC PNL TOTAL CA: CPT

## 2022-11-15 PROCEDURE — 51741 ELECTRO-UROFLOWMETRY FIRST: CPT | Mod: 51 | Performed by: STUDENT IN AN ORGANIZED HEALTH CARE EDUCATION/TRAINING PROGRAM

## 2022-11-15 PROCEDURE — 81002 URINALYSIS NONAUTO W/O SCOPE: CPT | Performed by: STUDENT IN AN ORGANIZED HEALTH CARE EDUCATION/TRAINING PROGRAM

## 2022-11-15 PROCEDURE — 99214 OFFICE O/P EST MOD 30 MIN: CPT | Mod: 25 | Performed by: STUDENT IN AN ORGANIZED HEALTH CARE EDUCATION/TRAINING PROGRAM

## 2022-11-15 PROCEDURE — 36415 COLL VENOUS BLD VENIPUNCTURE: CPT

## 2022-11-15 PROCEDURE — 51784 ANAL/URINARY MUSCLE STUDY: CPT | Mod: 51 | Performed by: STUDENT IN AN ORGANIZED HEALTH CARE EDUCATION/TRAINING PROGRAM

## 2022-11-15 PROCEDURE — 93005 ELECTROCARDIOGRAM TRACING: CPT

## 2022-11-15 PROCEDURE — 51729 CYSTOMETROGRAM W/VP&UP: CPT | Performed by: STUDENT IN AN ORGANIZED HEALTH CARE EDUCATION/TRAINING PROGRAM

## 2022-11-15 PROCEDURE — 51797 INTRAABDOMINAL PRESSURE TEST: CPT | Performed by: STUDENT IN AN ORGANIZED HEALTH CARE EDUCATION/TRAINING PROGRAM

## 2022-11-15 ASSESSMENT — FIBROSIS 4 INDEX
FIB4 SCORE: 1.06
FIB4 SCORE: 1.06

## 2022-11-15 NOTE — PROCEDURES
Procedure note: Complex urodynamic testing    Procedure performed:    -     91366 Complex Uroflowmetry  57466 Complex CMG w/ voiding pressure study AND urethral pressure  03591 Complex CMG w/ voiding pressure study  18548 EMG studies anal or urethral sphincter   01949 Intraabdominal catheter       Indication: Ms. Gallegos is a 77 year old with urinary incontinecne and ssignificnat prolapse. Her symptoms include:    Bladder symptoms:     Voids per day: 10+      Voids per night: 2                 Urinary incontinence episodes per day: none              Bladder emptying: Incomplete              Voiding symptoms: Strain-Push to Void and Double or Triple Voiding              UTI in last 12 months: 2 in last year    She presents for complex urodynamic testing today to fully elucidate her bladder function and symptom pathophysiology, and to evaluate if an anti-incontinence procedure is indicated at her upcoming prolapse repair.     Verbal consent was obtained after review of risk and benefit.     Chaperone: Nina Lynne MA      Procedure: The patient was taken to the urodynamic suite and placed in the urodynamic chair. She underwent sterile prep with betadine prior to catheterization. There was a negative urinalysis. Air-charged catheters were placed in the urethra/bladder and vagina. Urodynamics were performed using routine techniques. Prolapse was reduced with a cotton scopette for stress testing. There were no complications.       Urodynamic findings:     Preliminary Uroflometry     Flow pattern: intermittent  Maximum flow: 5.8 mL/sec  Average flow: 2.7 mL/sec  Voided volume: 72 mL  Post-void residual: 110 mL  Flow time: 25 sec    Filling cystometrogram    First sensation: 210 mL  First desire: 301 mL  Strong Desire: 295 mL  Urodynamic capacity: 306 mL  Stress leakage: mild at capacity   Uninhibited detrusor contractions present: no  Leakage with DO: no  Leak point pressures  At 204mL volume: did not leak to 89 cm  H2O  At 303mL volume: small leak to 118 cm H2O  Compliance: normal    Urethral pressure profile    Maximum urethral closing pressure (MUCP): 59 cm H2O  Morphology: normal    Pressure voiding study    The patient's voiding mechanism was accomplished by valsalva with small detrusor contraction  Max flow: 26 mL/sec  Average flow: 10 mL/sec  Post-void residual: 31 mL  Pdet at peak flow: 2.5 cm H2O  Flow time: 24 sec  Pelvic floor EMG silenced during voiding: yes    Pelvic floor EMG: Normal     Assessment:     She has completed urodynamic testing, which was uncomplicated.     Filling phase: Mild RAI at capacity, no uninhibited detrusor contractions, normal capacity, normal compliance, normal mid urethral closure pressure  Voiding phase: Dysfunctional voiding with Valsalva effort, incomplete emptying on initial uroflow, somewhat improved after elevation on pressure flow, minimal detrusor contraction.    Plan:   - While she has mild stress incontinence storage capacity with prolapse reduction, she also has significant voiding dysfunction, combined with smoking which is a risk for mesh implantation I would recommend only performing prolapse surgery and not performing a concomitant sling procedure.  If stress incontinence worsens or persist after surgery we can discuss bulking in a staged approach  - See office encounter for full procedural counseling  - Counseled on normal post-UDS symptoms including burning and possible hematuria. If this persists after 2 days she should call or send [employer] message.     Alex John MD, FACOG    Female Pelvic Medicine and Reconstructive Surgery  Department of Obstetrics and Gynecology  [facility]

## 2022-11-15 NOTE — PROGRESS NOTES
Urogynecology and Pelvic Reconstructive Surgery Follow Up    Remy Gallegos MRN:[id number] :1945    Referred by: Tan Beckett MD    Reason for Visit:   No chief complaint on file.        Subjective     History of Presenting Illness:    Ms.Elanna Gallegos is a 77 y.o. year old P3 who presents for follow up.     At last visit she was instructed to follow up pelvic cyst for ovarian cyst vs exophytic fibroid, but has not yet obtained imaging, and unsure if she can prior to surgery.     Symptoms are still bothersome.     She has bee using vaginal estrogen but stopped clobetasol due to irritation from the cream.          Initial HPI: She was referred by her urologist Dr. Beckett for the evaluation and management of prolapse.     She is bothered by prolapse, difficulty emptying her bladder.  She said prolapse for 5 to 6 years which is slowly worsening.  She is interested in definitive management at this time and has been told about pessary, which she would prefer to avoid.    She also has a history of significant vaginal irritation and itching, with no prior treatments.  She also notes vaginal discharge, not foul-smelling.    History noted that on CT imaging from 2021 a left exophytic uterine fibroid versus ovarian mass was noted.  No follow-up imaging is yet been performed.     Prior Pelvic surgery:   Tubal ligation     Prior treatment:   None       Pelvic floor symptom review:     Bladder:   Voids per day: 10+ Voids per night: 2      Urinary incontinence episodes per day: none   Bladder emptying: Incomplete   Voiding symptoms: Strain-Push to Void and Double or Triple Voiding   UTI in last 12 months: 2 in last year   Other urologic history:       Prolapse:     Prolapse symptoms: Bulge   Degree of prolapse: Beyond Introitus   Duration of prolapse symptoms: 5-6, worsening      Bowel:    Constipation: No  - h/o colitis   Bowel movements per day: varies    Straining to empty bowels: No    Splinting to evacuate:  No    Painful evacuation: No    Difficulty emptying rectum: No    Incontinence to stool: No   Incontinence to gas: No     Blood in stool: no   Hemorrhoids: No    Bowel conditions: colitis, unspecified   Most recent colonoscopy: due for colonoscopy in 3 weeks for recurrent colitis       Sexual function:    Sexually active: No  - not since 49    Pain with intercourse: No       Pelvic Pain: No      Past medical and surgical history    Past obstetric history   Number of vaginal deliveries: 3   Number of  deliveries: 0    Past gynecological history:    Last menstrual period: postmenopausal, no PMB   History of endometrial polyps:  No    History of endometriosis: No    History of cervical dysplasia: No     Last pap: 65yo       Past medical history:  Past Medical History:   Diagnosis Date    Hyperlipidemia      Past surgical history:  Past Surgical History:   Procedure Laterality Date    TUBAL COAGULATION LAPAROSCOPIC BILATERAL       Medications:has a current medication list which includes the following prescription(s): atorvastatin, tramadol hcl, estradiol, clobetasol, and senna-docusate.  Allergies:Patient has no allergy information on record.  Family history:No family history on file.  Social history: reports that she has been smoking cigarettes. She has been smoking an average of .5 packs per day. She has never used smokeless tobacco. She reports that she does not drink alcohol and does not use drugs.    Review of systems: A full review of systems was performed, and negative with the exception of want is noted above in the HPI.        Objective        There were no vitals taken for this visit.    Physical Exam  Vitals reviewed. Exam conducted with a chaperone present (MA - see notes.).   Constitutional:       Appearance: Normal appearance.   HENT:      Head: Normocephalic.      Mouth/Throat:      Mouth: Mucous membranes are moist.   Cardiovascular:      Rate and Rhythm: Normal rate.   Pulmonary:      Effort:  Pulmonary effort is normal.   Abdominal:      Palpations: Abdomen is soft. There is no mass.      Tenderness: There is no abdominal tenderness.   Skin:     General: Skin is warm and dry.   Neurological:      Mental Status: She is alert.   Psychiatric:         Mood and Affect: Mood normal.       Genitourinary:    External female genitalia: WNL   Vulva: Lichen Sclerosis changes with lacy reticular pattern in a figure-of-eight around the vulva and anus.   Bulbocavernosus reflex: Intact   Anal wink reflex: Intact   Perineal sensation: WNL   Urethra: Caruncle   Vagina: Atrophic   Atrophy: Severe   Cough stress test: Negative; positive with apex reduced     Pelvic floor:    POP-Q: Aa -1.5 / Ba -1.5 / TVL 9 / C +3.5 / D -3 / Ap -2 / Bp -2 / GH 3.5 / PB 2 / Aa/Ba -1.5 with apex reduced    Prolapse stage: 3   Paravaginal defect: none   Cervical elongation: Yes   Cervix: small polyp at os, otherwise normal appearing. Pap collected   Urethral tenderness: No    Bladder/ suprapubic tenderness: No    Levator tenderness: None   Levator muscle tone: WNL   Pelvic floor contraction strength (modified Oxford scale): 2=Weak   Pelvic floor contraction duration: Brief    Bimanual exam: Small, Mobile Uterus with firm mobile and well-circumscribed adnexal mass on the left, unable to determine if connected to uterus or separate adnexal structure. Cervical elongation.     Procedure Performed:     Urodynamics     Filling phase: Mild RAI at capacity, no uninhibited detrusor contractions, normal capacity, normal compliance, normal mid urethral closure pressure  Voiding phase: Dysfunctional voiding with Valsalva effort, incomplete emptying on initial uroflow, somewhat improved after elevation on pressure flow, minimal detrusor contraction.    Diagnostic test and records review:      Post-void residual: 130mL, performed by Bladder Scanner    Labs:     Radiology:     CT A/P 8/2021: Images reviewed, appears to be pedunculated fibroid, however  unclear based on imaging modality.  Recommend further work-up    Pelvis: There is a mass within the pelvis on the left possibly a exophytic uterine fibroid versus ovarian mass. Pelvic ultrasound to suggested to further evaluate. It measures approximately 3.6 x 3.2 x 4.8 cm. Bladder is decompressed.  IMPRESSION:  1.  Colonic diverticulosis without acute diverticulitis.  2.  No bowel obstruction or definite acute inflammation.  3.  Indeterminate left pelvic mass which could be uterine fibroid versus ovarian in etiology. Pelvic ultrasound is recommended for further evaluation.  4.  Small hiatal hernia.    Documentation reviewed: Prior EMR Records    Outside records reviewed: 3 pages           Assessment & Plan     Ms.Elanna Gallegos is a 77 y.o. year old P3 with significant uterine prolapse with cervical elongation, lichen sclerosis, atrophy, overactive bladder. We discussed my recommendations for further diagnosis and treatment at length today.     1. Incomplete uterovaginal prolapse  2. Cervical hypertrophic elongation  3. Cystocele, midline  4. Incomplete bladder emptying    Surgical options include vaginal and abdominal reconstructive approaches, as well as obliterative approaches which close the vaginal canal.  Given the predominance of her uterine prolapse as well as significant cervical elongation, I strongly recommend a hysterectomy as a part of her prolapse repair.  Usually, this could be performed either vaginally or laparoscopically, however due to the unclear nature of her uterine fibroid versus ovarian mass, a laparoscopic approach would allow full visualization of the adnexa to rule out pathology, as well as a significantly high apical suspension.  While she is not sexually active and has no interest in future sexual activity, she has decent anterior and posterior wall support which would make my ability to perform a procedure such as a colpectomy more difficult.    After detailed counseling about all  prolapse treatment options and shared decision-making, Ms. Gallegos opts for a laparoscopic/robotic reconstructive approach to prolapse repair via Robotic assisted laparoscopic total hysterectomy, bilateral salpingo-oophorectomy, vault suspension, possible anterior/posterior repair/perineorrhaphy,  cystourethroscopy, and all indicated procedures.    [facility] supplementary consent was signed and witnessed. Pre-operative urodynamics showed mild stress incontinence towards capacity, however with significant voiding dysfunction and minimal detrusor activity.  These factors, combined with smoking, and need for shorter surgery limited recommend not performing a concomitant sling procedure at the time of prolapse repair, and treating in a staged manner.  If she has stress incontinence after surgery we could discuss bulking.     Benefits of surgery were reviewed, including functional outcomes (bladder/bowel/sexual). Risks of surgery were  also discussed including anesthesia, bleeding, infection, damage to surrounding organs (bladder, ureter, urethra, bowel, blood vessel, nerves), possible blood transfusion, recurrent prolapse,  transient voiding dysfunction requiring catheterization, new/worsening urinary incontinence, pain with sex.  Increased due to her smoking, as well as risk of healing/wound complications, as well as venous thromboembolism risk, which is why recommend heparin prophylaxis in addition to ambulation and SCDs.    Specifically she was counselled as to what to expect on the day of surgery in the holding area, counseled that medical students may be involved in her care. She will likely go home on the same day as the surgery. She was counseled on what to expect in the recovery room including voiding trial and possible vaginal packing removal, as well as wh anesthesia risks at to expect if voiding trial is unsuccessful - given options of indwelling catheter vs. intermittent straight cath.   Discussed trajectory of  recovery, including maximizing NSAIDs and Tylenol, and that narcotics are not routinely given.  Discussed restrictions including heavy lifting requiring straining, driving while on narcotics, nothing in the vagina and no bathing/swimming for at least 6 weeks until evaluated in the office.     *Please see the corresponding after visit summary counseling packet for detailed counseling provided for the patient.     Labs: CMP/BMP at pre-op visit  Pre-op meds: acetaminophen 1000mg PO, phenazopyridine 200mg PO, scopolamine patch, Cefazolin 2gm IV, heparin 5000 units  Post-op prescriptions sent today: ibuprofen, tylenol, miralax    Home meds: stop tramadol today as well as NSAIDS/supplements. She is not taking blood thinners.       5. Pelvic mass  -My prior recommendations were that she obtain a pelvic ultrasound to ensure whether this is an exophytic fibroid or a ovarian mass.  This from imaging from over 1 year ago, and on my review of the CT it appears to be more of an ecstatic fibroid.  No symptomatology changes since this last imaging.  She notes that she will unlikely be able to get a pelvic ultrasound before her surgical date, and Ca125 testing review noted that the will likely be expensive given her insurance coverage.  While I prefer to have reimaging and Ca125 before surgery, she was counseled that if we move forward with surgery and perform a bilateral salpingo-oophorectomy, this will likely be sufficient unless ovarian pathology comes back as positive, after which she would need a repeat surgery/staging if any malignancy is found.  She understands this risk and is still like to move forward with surgery without repeat imaging.  All questions were answered.    6. Atrophic vaginitis, frequent UTI  She has vaginal atrophy on examination. Reviewed risks, benefits, and indications for vaginal estrogen therapy.  Continue with vaginal estrogen therapy twice weekly.       7. Lichen sclerosus  - Didn't start using it  due to burning, however inflammatory changes still present.  She was waiting until the symptoms just went away on their own.  I counseled that lichen sclerosus is a chronic inflammatory condition and steroids recommended to decrease progression, introital stenosis/adhesions, lower risk of atypical vulvar cancer.    8. Cervical polyp  - pap previously HPV neg . Remove at time of hyst for final path    10. OAB (overactive bladder)  11. Nocturia  -Monitor improvement after prolapse repair  -As noted above she had mild stress urinary incontinence towards capacity with reduction of prolapse, however had significant voiding dysfunction.  Voiding dysfunction compliant with rest of sling and a smoking patient led me to recommend a staged approach to incontinence.  If she continues to have stress incontinence after surgery we can discuss possible bulking procedure.                 Alex John MD, FACOG    Female Pelvic Medicine and Reconstructive Surgery  Department of Obstetrics and Gynecology  [facility]      This medical record contains text that has been entered with the assistance of computer voice recognition and dictation software.  Therefore, it may contain unintended errors in text, spelling, punctuation, or grammar

## 2022-11-22 ENCOUNTER — ANESTHESIA EVENT (OUTPATIENT)
Dept: SURGERY | Facility: MEDICAL CENTER | Age: 77
End: 2022-11-22
Payer: MEDICARE

## 2022-11-23 ENCOUNTER — PHARMACY VISIT (OUTPATIENT)
Dept: PHARMACY | Facility: MEDICAL CENTER | Age: 77
End: 2022-11-23
Payer: COMMERCIAL

## 2022-11-23 ENCOUNTER — ANESTHESIA (OUTPATIENT)
Dept: SURGERY | Facility: MEDICAL CENTER | Age: 77
End: 2022-11-23
Payer: MEDICARE

## 2022-11-23 ENCOUNTER — HOSPITAL ENCOUNTER (OUTPATIENT)
Facility: MEDICAL CENTER | Age: 77
End: 2022-11-23
Attending: STUDENT IN AN ORGANIZED HEALTH CARE EDUCATION/TRAINING PROGRAM | Admitting: STUDENT IN AN ORGANIZED HEALTH CARE EDUCATION/TRAINING PROGRAM
Payer: MEDICARE

## 2022-11-23 VITALS
HEART RATE: 77 BPM | BODY MASS INDEX: 21.41 KG/M2 | DIASTOLIC BLOOD PRESSURE: 72 MMHG | TEMPERATURE: 96.7 F | WEIGHT: 128.53 LBS | SYSTOLIC BLOOD PRESSURE: 151 MMHG | OXYGEN SATURATION: 94 % | RESPIRATION RATE: 18 BRPM | HEIGHT: 65 IN

## 2022-11-23 DIAGNOSIS — G89.18 POST-OP PAIN: ICD-10-CM

## 2022-11-23 PROBLEM — N88.4: Status: ACTIVE | Noted: 2022-11-23

## 2022-11-23 PROBLEM — N81.6 RECTOCELE: Status: ACTIVE | Noted: 2022-11-23

## 2022-11-23 PROBLEM — N81.2 INCOMPLETE UTEROVAGINAL PROLAPSE: Status: ACTIVE | Noted: 2022-11-23

## 2022-11-23 PROBLEM — N81.11 CYSTOCELE, MIDLINE: Status: ACTIVE | Noted: 2022-11-23

## 2022-11-23 LAB — PATHOLOGY CONSULT NOTE: NORMAL

## 2022-11-23 PROCEDURE — 160031 HCHG SURGERY MINUTES - 1ST 30 MINS LEVEL 5: Performed by: STUDENT IN AN ORGANIZED HEALTH CARE EDUCATION/TRAINING PROGRAM

## 2022-11-23 PROCEDURE — 502714 HCHG ROBOTIC SURGERY SERVICES: Performed by: STUDENT IN AN ORGANIZED HEALTH CARE EDUCATION/TRAINING PROGRAM

## 2022-11-23 PROCEDURE — 99100 ANES PT EXTEME AGE<1 YR&>70: CPT | Performed by: STUDENT IN AN ORGANIZED HEALTH CARE EDUCATION/TRAINING PROGRAM

## 2022-11-23 PROCEDURE — 160035 HCHG PACU - 1ST 60 MINS PHASE I: Performed by: STUDENT IN AN ORGANIZED HEALTH CARE EDUCATION/TRAINING PROGRAM

## 2022-11-23 PROCEDURE — A9270 NON-COVERED ITEM OR SERVICE: HCPCS | Performed by: STUDENT IN AN ORGANIZED HEALTH CARE EDUCATION/TRAINING PROGRAM

## 2022-11-23 PROCEDURE — 160042 HCHG SURGERY MINUTES - EA ADDL 1 MIN LEVEL 5: Performed by: STUDENT IN AN ORGANIZED HEALTH CARE EDUCATION/TRAINING PROGRAM

## 2022-11-23 PROCEDURE — 700111 HCHG RX REV CODE 636 W/ 250 OVERRIDE (IP): Performed by: STUDENT IN AN ORGANIZED HEALTH CARE EDUCATION/TRAINING PROGRAM

## 2022-11-23 PROCEDURE — RXMED WILLOW AMBULATORY MEDICATION CHARGE: Performed by: STUDENT IN AN ORGANIZED HEALTH CARE EDUCATION/TRAINING PROGRAM

## 2022-11-23 PROCEDURE — 160048 HCHG OR STATISTICAL LEVEL 1-5: Performed by: STUDENT IN AN ORGANIZED HEALTH CARE EDUCATION/TRAINING PROGRAM

## 2022-11-23 PROCEDURE — 88305 TISSUE EXAM BY PATHOLOGIST: CPT

## 2022-11-23 PROCEDURE — 700101 HCHG RX REV CODE 250: Performed by: STUDENT IN AN ORGANIZED HEALTH CARE EDUCATION/TRAINING PROGRAM

## 2022-11-23 PROCEDURE — 160025 RECOVERY II MINUTES (STATS): Performed by: STUDENT IN AN ORGANIZED HEALTH CARE EDUCATION/TRAINING PROGRAM

## 2022-11-23 PROCEDURE — 160047 HCHG PACU  - EA ADDL 30 MINS PHASE II: Performed by: STUDENT IN AN ORGANIZED HEALTH CARE EDUCATION/TRAINING PROGRAM

## 2022-11-23 PROCEDURE — 160036 HCHG PACU - EA ADDL 30 MINS PHASE I: Performed by: STUDENT IN AN ORGANIZED HEALTH CARE EDUCATION/TRAINING PROGRAM

## 2022-11-23 PROCEDURE — 700102 HCHG RX REV CODE 250 W/ 637 OVERRIDE(OP): Performed by: STUDENT IN AN ORGANIZED HEALTH CARE EDUCATION/TRAINING PROGRAM

## 2022-11-23 PROCEDURE — 58571 TLH W/T/O 250 G OR LESS: CPT | Performed by: STUDENT IN AN ORGANIZED HEALTH CARE EDUCATION/TRAINING PROGRAM

## 2022-11-23 PROCEDURE — 160046 HCHG PACU - 1ST 60 MINS PHASE II: Performed by: STUDENT IN AN ORGANIZED HEALTH CARE EDUCATION/TRAINING PROGRAM

## 2022-11-23 PROCEDURE — 56605 BIOPSY OF VULVA/PERINEUM: CPT | Performed by: STUDENT IN AN ORGANIZED HEALTH CARE EDUCATION/TRAINING PROGRAM

## 2022-11-23 PROCEDURE — 160009 HCHG ANES TIME/MIN: Performed by: STUDENT IN AN ORGANIZED HEALTH CARE EDUCATION/TRAINING PROGRAM

## 2022-11-23 PROCEDURE — 58999 UNLISTED PX FML GENITAL SYS: CPT | Performed by: STUDENT IN AN ORGANIZED HEALTH CARE EDUCATION/TRAINING PROGRAM

## 2022-11-23 PROCEDURE — 700105 HCHG RX REV CODE 258: Performed by: STUDENT IN AN ORGANIZED HEALTH CARE EDUCATION/TRAINING PROGRAM

## 2022-11-23 PROCEDURE — 00840 ANES IPER PX LOWER ABD NOS: CPT | Performed by: STUDENT IN AN ORGANIZED HEALTH CARE EDUCATION/TRAINING PROGRAM

## 2022-11-23 PROCEDURE — 160002 HCHG RECOVERY MINUTES (STAT): Performed by: STUDENT IN AN ORGANIZED HEALTH CARE EDUCATION/TRAINING PROGRAM

## 2022-11-23 RX ORDER — HYDROMORPHONE HYDROCHLORIDE 1 MG/ML
0.5 INJECTION, SOLUTION INTRAMUSCULAR; INTRAVENOUS; SUBCUTANEOUS
Status: DISCONTINUED | OUTPATIENT
Start: 2022-11-23 | End: 2022-11-23 | Stop reason: HOSPADM

## 2022-11-23 RX ORDER — LIDOCAINE HYDROCHLORIDE 20 MG/ML
INJECTION, SOLUTION EPIDURAL; INFILTRATION; INTRACAUDAL; PERINEURAL PRN
Status: DISCONTINUED | OUTPATIENT
Start: 2022-11-23 | End: 2022-11-23 | Stop reason: SURG

## 2022-11-23 RX ORDER — ONDANSETRON 2 MG/ML
4 INJECTION INTRAMUSCULAR; INTRAVENOUS
Status: DISCONTINUED | OUTPATIENT
Start: 2022-11-23 | End: 2022-11-23 | Stop reason: HOSPADM

## 2022-11-23 RX ORDER — ROCURONIUM BROMIDE 10 MG/ML
INJECTION, SOLUTION INTRAVENOUS PRN
Status: DISCONTINUED | OUTPATIENT
Start: 2022-11-23 | End: 2022-11-23 | Stop reason: SURG

## 2022-11-23 RX ORDER — HALOPERIDOL 5 MG/ML
1 INJECTION INTRAMUSCULAR
Status: DISCONTINUED | OUTPATIENT
Start: 2022-11-23 | End: 2022-11-23 | Stop reason: HOSPADM

## 2022-11-23 RX ORDER — ONDANSETRON 2 MG/ML
INJECTION INTRAMUSCULAR; INTRAVENOUS PRN
Status: DISCONTINUED | OUTPATIENT
Start: 2022-11-23 | End: 2022-11-23 | Stop reason: SURG

## 2022-11-23 RX ORDER — DEXAMETHASONE SODIUM PHOSPHATE 4 MG/ML
INJECTION, SOLUTION INTRA-ARTICULAR; INTRALESIONAL; INTRAMUSCULAR; INTRAVENOUS; SOFT TISSUE PRN
Status: DISCONTINUED | OUTPATIENT
Start: 2022-11-23 | End: 2022-11-23 | Stop reason: SURG

## 2022-11-23 RX ORDER — MEPERIDINE HYDROCHLORIDE 25 MG/ML
6.25 INJECTION INTRAMUSCULAR; INTRAVENOUS; SUBCUTANEOUS
Status: DISCONTINUED | OUTPATIENT
Start: 2022-11-23 | End: 2022-11-23 | Stop reason: HOSPADM

## 2022-11-23 RX ORDER — SODIUM CHLORIDE, SODIUM LACTATE, POTASSIUM CHLORIDE, CALCIUM CHLORIDE 600; 310; 30; 20 MG/100ML; MG/100ML; MG/100ML; MG/100ML
INJECTION, SOLUTION INTRAVENOUS
Status: DISCONTINUED | OUTPATIENT
Start: 2022-11-23 | End: 2022-11-23 | Stop reason: SURG

## 2022-11-23 RX ORDER — BUPIVACAINE HYDROCHLORIDE 2.5 MG/ML
INJECTION, SOLUTION EPIDURAL; INFILTRATION; INTRACAUDAL
Status: DISCONTINUED | OUTPATIENT
Start: 2022-11-23 | End: 2022-11-23 | Stop reason: HOSPADM

## 2022-11-23 RX ORDER — HYDROMORPHONE HYDROCHLORIDE 1 MG/ML
0.1 INJECTION, SOLUTION INTRAMUSCULAR; INTRAVENOUS; SUBCUTANEOUS
Status: DISCONTINUED | OUTPATIENT
Start: 2022-11-23 | End: 2022-11-23 | Stop reason: HOSPADM

## 2022-11-23 RX ORDER — SCOLOPAMINE TRANSDERMAL SYSTEM 1 MG/1
1 PATCH, EXTENDED RELEASE TRANSDERMAL
Status: DISCONTINUED | OUTPATIENT
Start: 2022-11-23 | End: 2022-11-23 | Stop reason: HOSPADM

## 2022-11-23 RX ORDER — KETOROLAC TROMETHAMINE 30 MG/ML
INJECTION, SOLUTION INTRAMUSCULAR; INTRAVENOUS PRN
Status: DISCONTINUED | OUTPATIENT
Start: 2022-11-23 | End: 2022-11-23 | Stop reason: SURG

## 2022-11-23 RX ORDER — PHENAZOPYRIDINE HYDROCHLORIDE 200 MG/1
200 TABLET, FILM COATED ORAL
Status: COMPLETED | OUTPATIENT
Start: 2022-11-23 | End: 2022-11-23

## 2022-11-23 RX ORDER — HYDRALAZINE HYDROCHLORIDE 20 MG/ML
5 INJECTION INTRAMUSCULAR; INTRAVENOUS
Status: DISCONTINUED | OUTPATIENT
Start: 2022-11-23 | End: 2022-11-23 | Stop reason: HOSPADM

## 2022-11-23 RX ORDER — IBUPROFEN 400 MG/1
TABLET ORAL
Qty: 60 TABLET | Refills: 1 | Status: SHIPPED | OUTPATIENT
Start: 2022-11-23

## 2022-11-23 RX ORDER — SODIUM CHLORIDE, SODIUM LACTATE, POTASSIUM CHLORIDE, CALCIUM CHLORIDE 600; 310; 30; 20 MG/100ML; MG/100ML; MG/100ML; MG/100ML
INJECTION, SOLUTION INTRAVENOUS CONTINUOUS
Status: DISCONTINUED | OUTPATIENT
Start: 2022-11-23 | End: 2022-11-23 | Stop reason: HOSPADM

## 2022-11-23 RX ORDER — HEPARIN SODIUM 5000 [USP'U]/ML
5000 INJECTION, SOLUTION INTRAVENOUS; SUBCUTANEOUS ONCE
Status: COMPLETED | OUTPATIENT
Start: 2022-11-23 | End: 2022-11-23

## 2022-11-23 RX ORDER — ACETAMINOPHEN 500 MG
TABLET ORAL
Qty: 60 TABLET | Refills: 1 | Status: SHIPPED | OUTPATIENT
Start: 2022-11-23

## 2022-11-23 RX ORDER — OXYCODONE HCL 5 MG/5 ML
5 SOLUTION, ORAL ORAL
Status: COMPLETED | OUTPATIENT
Start: 2022-11-23 | End: 2022-11-23

## 2022-11-23 RX ORDER — OXYCODONE HCL 5 MG/5 ML
10 SOLUTION, ORAL ORAL
Status: COMPLETED | OUTPATIENT
Start: 2022-11-23 | End: 2022-11-23

## 2022-11-23 RX ORDER — CEFAZOLIN SODIUM 1 G/3ML
INJECTION, POWDER, FOR SOLUTION INTRAMUSCULAR; INTRAVENOUS PRN
Status: DISCONTINUED | OUTPATIENT
Start: 2022-11-23 | End: 2022-11-23 | Stop reason: SURG

## 2022-11-23 RX ORDER — ACETAMINOPHEN 500 MG
1000 TABLET ORAL
Status: COMPLETED | OUTPATIENT
Start: 2022-11-23 | End: 2022-11-23

## 2022-11-23 RX ORDER — HYDROMORPHONE HYDROCHLORIDE 2 MG/ML
INJECTION, SOLUTION INTRAMUSCULAR; INTRAVENOUS; SUBCUTANEOUS PRN
Status: DISCONTINUED | OUTPATIENT
Start: 2022-11-23 | End: 2022-11-23 | Stop reason: SURG

## 2022-11-23 RX ORDER — HYDROMORPHONE HYDROCHLORIDE 1 MG/ML
0.2 INJECTION, SOLUTION INTRAMUSCULAR; INTRAVENOUS; SUBCUTANEOUS
Status: DISCONTINUED | OUTPATIENT
Start: 2022-11-23 | End: 2022-11-23 | Stop reason: HOSPADM

## 2022-11-23 RX ORDER — DIPHENHYDRAMINE HYDROCHLORIDE 50 MG/ML
12.5 INJECTION INTRAMUSCULAR; INTRAVENOUS
Status: DISCONTINUED | OUTPATIENT
Start: 2022-11-23 | End: 2022-11-23 | Stop reason: HOSPADM

## 2022-11-23 RX ORDER — LABETALOL HYDROCHLORIDE 5 MG/ML
5 INJECTION, SOLUTION INTRAVENOUS
Status: DISCONTINUED | OUTPATIENT
Start: 2022-11-23 | End: 2022-11-23 | Stop reason: HOSPADM

## 2022-11-23 RX ORDER — POLYETHYLENE GLYCOL 3350 17 G/17G
17 POWDER, FOR SOLUTION ORAL DAILY
Qty: 510 G | Refills: 1 | Status: SHIPPED | OUTPATIENT
Start: 2022-11-23

## 2022-11-23 RX ADMIN — SODIUM CHLORIDE, POTASSIUM CHLORIDE, SODIUM LACTATE AND CALCIUM CHLORIDE: 600; 310; 30; 20 INJECTION, SOLUTION INTRAVENOUS at 08:01

## 2022-11-23 RX ADMIN — KETOROLAC TROMETHAMINE 15 MG: 30 INJECTION, SOLUTION INTRAMUSCULAR at 09:41

## 2022-11-23 RX ADMIN — HYDROMORPHONE HYDROCHLORIDE 0.5 MCG: 2 INJECTION INTRAMUSCULAR; INTRAVENOUS; SUBCUTANEOUS at 07:35

## 2022-11-23 RX ADMIN — ONDANSETRON 8 MG: 2 INJECTION INTRAMUSCULAR; INTRAVENOUS at 09:41

## 2022-11-23 RX ADMIN — HYDROMORPHONE HYDROCHLORIDE 0.5 MCG: 2 INJECTION INTRAMUSCULAR; INTRAVENOUS; SUBCUTANEOUS at 08:01

## 2022-11-23 RX ADMIN — ACETAMINOPHEN 1000 MG: 500 TABLET ORAL at 06:57

## 2022-11-23 RX ADMIN — PROPOFOL 30 MG: 10 INJECTION, EMULSION INTRAVENOUS at 09:48

## 2022-11-23 RX ADMIN — PROPOFOL 100 MG: 10 INJECTION, EMULSION INTRAVENOUS at 07:42

## 2022-11-23 RX ADMIN — SUGAMMADEX 200 MG: 100 INJECTION, SOLUTION INTRAVENOUS at 09:46

## 2022-11-23 RX ADMIN — CEFAZOLIN 2 G: 330 INJECTION, POWDER, FOR SOLUTION INTRAMUSCULAR; INTRAVENOUS at 07:52

## 2022-11-23 RX ADMIN — HYDROMORPHONE HYDROCHLORIDE 0.2 MG: 1 INJECTION, SOLUTION INTRAMUSCULAR; INTRAVENOUS; SUBCUTANEOUS at 11:00

## 2022-11-23 RX ADMIN — MINERAL OIL, PETROLATUM 1 APPLICATION: 425; 573 OINTMENT OPHTHALMIC at 07:48

## 2022-11-23 RX ADMIN — PHENAZOPYRIDINE 200 MG: 200 TABLET ORAL at 06:57

## 2022-11-23 RX ADMIN — DEXAMETHASONE SODIUM PHOSPHATE 8 MG: 4 INJECTION, SOLUTION INTRA-ARTICULAR; INTRALESIONAL; INTRAMUSCULAR; INTRAVENOUS; SOFT TISSUE at 07:47

## 2022-11-23 RX ADMIN — ROCURONIUM BROMIDE 50 MG: 10 INJECTION, SOLUTION INTRAVENOUS at 07:42

## 2022-11-23 RX ADMIN — HEPARIN SODIUM 5000 UNITS: 5000 INJECTION, SOLUTION INTRAVENOUS; SUBCUTANEOUS at 07:28

## 2022-11-23 RX ADMIN — OXYCODONE HYDROCHLORIDE 10 MG: 5 SOLUTION ORAL at 10:15

## 2022-11-23 RX ADMIN — ROCURONIUM BROMIDE 20 MG: 10 INJECTION, SOLUTION INTRAVENOUS at 08:38

## 2022-11-23 RX ADMIN — LIDOCAINE HYDROCHLORIDE 80 MG: 20 INJECTION, SOLUTION EPIDURAL; INFILTRATION; INTRACAUDAL at 07:42

## 2022-11-23 RX ADMIN — HYDROMORPHONE HYDROCHLORIDE 0.2 MG: 1 INJECTION, SOLUTION INTRAMUSCULAR; INTRAVENOUS; SUBCUTANEOUS at 10:14

## 2022-11-23 RX ADMIN — SCOPOLAMINE 1 PATCH: 1.5 PATCH, EXTENDED RELEASE TRANSDERMAL at 06:56

## 2022-11-23 ASSESSMENT — FIBROSIS 4 INDEX: FIB4 SCORE: 0.96

## 2022-11-23 ASSESSMENT — PAIN SCALES - GENERAL: PAIN_LEVEL: 3

## 2022-11-23 ASSESSMENT — PAIN DESCRIPTION - PAIN TYPE
TYPE: SURGICAL PAIN
TYPE: SURGICAL PAIN

## 2022-11-23 NOTE — ANESTHESIA PREPROCEDURE EVALUATION
Case: [id number] Date/Time: 11/23/22 0715    Procedures:       ROBOTIC ASSISTED LAPAROSCOPIC HYSTERECTOMY WITH BILATERAL SALPINGO-OOPHORECTOMY, VAGINAL VAULT SUSPENSION, POSSIBLE ANTERIOR REPAIR, POSSIBLE POSTERIOR REPAIR/PERINEORRHAPHY, POSSIBLE MID URETHRAL SLING, CYSTOURETHROSCOPY      SALPINGO-OOPHORECTOMY, BILATERAL, LAPAROSCOPIC (Bilateral)      COLPOPEXY      COLPORRHAPHY, COMBINED ANTEROPOSTERIOR      BLADDER SLING, FEMALE      CYSTOSCOPY    Pre-op diagnosis: INCOMPLETE UTEROVAGINAL PROLAPSE, CERVICAL ELONGATION, CYSTOCELE, MIDLINE RECTOCELE STRESS URINARY INCONTINENCE, INCOMPLETE BLADDER EMPTYING    Location: [place] [address] / SURGERY [facility]    Surgeons: Alex John M.D.          Relevant Problems   No relevant active problems       Physical Exam    Airway   Mallampati: I  TM distance: >3 FB  Neck ROM: full       Cardiovascular - normal exam  Rhythm: regular  Rate: normal  (-) murmur     Dental              Pulmonary - normal exam  Breath sounds clear to auscultation     Abdominal    Neurological - normal exam                 Anesthesia Plan    ASA 2       Plan - general             Plan Factors:   Patient was previously instructed to abstain from smoking on day of procedure.  Patient did not smoke on day of procedure.      Induction: intravenous    Postoperative Plan: Postoperative administration of opioids is intended.    Pertinent diagnostic labs and testing reviewed    Informed Consent:    Anesthetic plan and risks discussed with patient.    Use of blood products discussed with: patient whom consented to blood products.

## 2022-11-23 NOTE — OP REPORT
OPERATIVE REPORT     Name: Remy Gallegos    : 1945    MRN: [id number]       PRE-OP DIAGNOSIS:   Incomplete uterovaginal prolapse  Cervical hypertrophic elongation  Adnexal mass  Vulvar inflammation, possible lichen sclerosis            POST-OP DIAGNOSIS:   Incomplete uterovaginal prolapse  Cervical hypertrophic elongation  Broad ligament fibroid  Vulvar inflammation, possible lichen sclerosis            PROCEDURE:   Robotic-assisted total laparoscopic hysterectomy, bilateral salpingo-oophorectomy (94981)  Robotic-assisted laparoscopic intraperitoneal uterosacral vault suspension (00078: ref CPT 31109)  Vulvar biopsy (80276)  Cystourethroscopy              SURGEON:   Surgeon(s) and Role:     * Alex John M.D. - Primary     * Galen Vazquez N.P. - Assist            ANESTHESIA: General             FINDINGS:       - Exam under anesthesia: Stage 3 prolapse, uterine/cervical perdominant with cervical elongation. At the completion of the procedure there was excellent tricompartmental support, no sutures were palpated rectally.    - Laparoscopy: No entry injuries to viscera or vasculature. Normal appearing peritoneal cavity and liver edge. Significant pelvic adhesions of sigmoid colon to uterus and pelvic sidewall. Left ovary encapsulated by colon and pelvic sidewall. 3cm broad ligament fibroid near left fundus. Otherwise normal-appearing uterus, bilateral fallopian tubes and ovaries. Ureters visualized bilaterally through the procedure. Normal survey prior to closure   - Cystourethroscopy: Normal appearing urothelium without lesions, masses, sutures, or perforations. Ureteral orifices noted in the normal orthotopic position, with brisk jets of urine bilaterally after all procedures were completed. Urethra was normal in appearance without evidence of stricture, perforation, or diverticulum.        ESTIMATED BLOOD LOSS: 25 mL            DRAINS: Guo                   SPECIMENS:   Vulvar biopsy  Uterus, bilateral  fallopian tubes and ovaries            IMPLANTS: None            COMPLICATIONS: None            DISPOSITION: Discharge            CONDITION: Stable            INDICATION FOR SURGERY:     Remy Gallegos is a 77 y.o. year old P3 with significant uterine prolapse with cervical elongation, adnexal fibroid vs cyst, lichen sclerosis. She was counseled on all approaches to prolapse treatment including observation, pessary and surgery. She was counseled on all methods of surgical prolapse repair.  Surgical options include vaginal and abdominal reconstructive approaches, as well as obliterative approaches which close the vaginal canal.  Given the predominance of her uterine prolapse as well as significant cervical elongation, I strongly recommend a hysterectomy as a part of her prolapse repair.  Usually, this could be performed either vaginally or laparoscopically, however due to the unclear nature of her uterine fibroid versus ovarian mass, a laparoscopic approach would allow full visualization of the adnexa to rule out pathology, as well as a significantly high apical suspension.  While she is not sexually active and has no interest in future sexual activity, she has decent anterior and posterior wall support which would make my ability to perform a procedure such as a colpectomy more difficult. After detailed counseling about all prolapse treatment options and shared decision-making, Ms. Gallegos opts for a laparoscopic/robotic reconstructive approach to prolapse repair via Robotic assisted laparoscopic total hysterectomy, bilateral salpingo-oophorectomy, vault suspension, possible anterior/posterior repair/perineorrhaphy,  cystourethroscopy, and all indicated procedures. [facility] supplementary consent was signed and witnessed. Pre-operative urodynamics showed mild stress incontinence towards capacity, however with significant voiding dysfunction and minimal detrusor activity.  These factors, combined with smoking, and need  for shorter surgery limited recommend not performing a concomitant sling procedure at the time of prolapse repair, and treating in a staged manner.  If she has stress incontinence after surgery we could discuss bulking. Benefits of surgery were reviewed, including functional outcomes (bladder/bowel/sexual). Risks of surgery were  also discussed including anesthesia, bleeding, infection, damage to surrounding organs (bladder, ureter, urethra, bowel, blood vessel, nerves), possible blood transfusion, recurrent prolapse,  transient voiding dysfunction requiring catheterization, new/worsening urinary incontinence, pain with sex.  Increased due to her smoking, as well as risk of healing/wound complications, as well as venous thromboembolism risk, which is why recommend heparin prophylaxis in addition to ambulation and SCDs. All questions answered and consent signed/witnessed.      TECHNIQUE:    I spoke with the patient in the preoperative holding area, where again risks, benefits, indications, and alternatives were reviewed and informed consent was obtained.  She was then transferred to the operative suite, where she was identified, procedure was confirmed.  She was placed in dorsal supine position, given general endotracheal anesthesia without difficulty.  She was then placed in a dorsal lithotomy position  in yellowfin stirrups with all pressure points padded.  She was prepared and draped in usual sterile fashion with arms tucked and all pressure points padded.  An appropriate time-out was performed, where patient was identified, procedure was confirmed, and the operative team was introduced.  It was also confirmed that patient did receive cefazolin 2 g IV for prophylaxis, and she also received DVT prophylaxis with sequential compression devices bilaterally throughout the case.  At this time, exam under anesthesia revealed findings noted above.   A 16-Sami Guo catheter was then placed in the patient's  bladder for  drainage throughout the procedure.  The anterior lip of the cervix was visualized with a speculum and grasped with a single-toothed tenaculum. The uterus was sounded to a depth of 7cm, and a medium V-care uterine manipulator was placed easily, and the balloon inflated. The handle was covered with a sterile towel.     The vulvar biopsy the procedure in the following fashion: The area of vulvar lichenification was observed, and the area of peak inflammation abutting urinary abnormal tissue was chosen on the left labium majora, and a 2 mm punch biopsy was performed with full-thickness of the epithelium.  This is then excised using Metzenbaum scissors.  The biopsy site was then reapproximated using 4-0 Vicryl interrupted sutures x2 with excellent hemostasis noted.    At this time, attention was turned to the patient's abdomen. After infiltration with marcaine, an 8mm infraumbilical incision was made. The fascia was grasped with a kocher clamp and elevated. A Veress needle was inserted with 2 pops heard, and intraperitoneal placement was confirmed with hanging drop test and 1mm Hg opening pressure. The peritoneal cavity was then insufflated to a pressure of 15mmHg.  An 8mm trocar was slowly advanced into the peritoneal cavity. The trocar was removed and the camera  inserted and a full survey was performed with the above findings noted. No entry injuries to bowl, omentum, mesentery or vasculature were visualized.  At this time, on the patient's right lateral side, approximately 8 cm lateral to the umbilicus, an 8 mm incision that was made after injection with marcaine, and an 8 mm robotic trocar was then advanced under direct visualization of the laparoscope without difficulty.  This was repeated with an 8mm robotic trocar approximately 16cm right lateral to the umbilicus. On the patient's left hand side, two additional robotic ports were placed at 8cm and 16cm lateral to the umbilicus after infiltration of Marcaine,  under direct visualization with the laparoscope. Excellent hemostasis was noted at all port sites. At this time, the patient was placed in steep Trendelenburg. The THEVAi Xi robot was then brought in for docking, and instruments were placed under direct visualization.     The proximal uterosacral ligaments were then marked for future reapproximation.  The uterus placed on tension and minimal good uterosacral tissue was visualized.  On patient's right-hand side only a flimsy amount of tissue traveled from the posterior cervix down to the level of the sacrum, and the area of breast tissue quality remote from the ureter and underlying structures was chosen.  A 2-0 Vicryl stent placed through this ligament for future identification after hysterectomy.  Attention was then turned to the left-hand side which is mostly obscured by rectum and bowel, and an area of future suspension was seen more laterally in this case, medial to the ureter, and a 2-0 Vicryl and rectal suture was placed for future placement of suspension sutures.    We then proceeded with the robotic assisted laparoscopic total hysterectomy and bilateral salpingo-oophorectomy in the following fashion: The right adnexa was visualized and the infundibulopelvic ligament was found to be remote from the peristalsing right ureter at the pelvic brim.  The ovary had been encapsulated by the posterior leaf of the broad ligament, which had to be carefully dissected away from the overlying structures before they can be placed on tension to fully visualize the infundibulopelvic ligament.  A window was then made in the peritoneum underlying the vasculature, and the IP ligament was then cauterized and transected close to the ovary, and far from the ureter.  Dissection was then carried down through the broad ligament to the round ligament which was cauterized and transected. The round ligament on medial traction, the posterior leaflet of the broad ligament was then  dissected further down to the level of the internal cervical os, lateralizing the ureter away from the cervix. Attention was then turned anteriorly where the vesicouterine peritoneum was dissected away from the uterus to create a bladder flap, to the level below the uterine manipulator cup. Uterine vessels were then carefully skeletonized and cauterized and transected perpendicularly at the level of the internal cervical os with excellent hemostasis noted. They were further lateralized away from the colpotomy site. Attention was then turned to the contralateral side of the uterus.  On the side, the ovaries not immediately visible, and there overlying adhesions from the sigmoid colon.  These were carefully taken down sharply with cold scissors.  The ureter was also not immediately available transperitoneally.  Traction was then placed on the intertibial pelvic ligament and the pelvic sidewall was then entered through the triangle of safety and dissected down visualizing the ureter away from the IP ligament.  A window was then created in the peritoneum under the presumed ovarian tissue, and eventually the pelvic ligament was then cauterized and transected with excellent stasis noted.  Careful dissection along the area of the ovary finally opened up a capsule, from which the fallopian tube fimbria were then visualized, and these were placed on traction for further dissection of the ovary away from the underlying structures.  Dissection was then carried down, through the broad ligament, lateralizing the ureters skeletonizing then, transecting the uterine vessels down to the level of the colpotomy site, as in the contralateral side.  The colpotopmy was then made using monopolar electrocautery with minimal energy and the uterus and ovarian specimen removed through the vagina. All pedicles were then inspected and found to be hemostatic at this time. The vaginal cuff was then closed in two layers using 0-Vloc barbed suture  in a running fashion in 2 layers, first of the vaginal epithelium, and second including the remainder of the cuff and the posterior peritoneum.  Care was taken to avoid placing sutures through the bladder. Excellent hemostasis noted.     The intraperitoneal uterosacral vault suspension then proceeded the following fashion in order to address the patient's predominantly apical prolapse.  An EEA sizer was placed into the vagina which was then placed on anterior traction.  On the patient's left-hand side, the previously prepped uterosacral proximal ligament was identified, a 2-0 PDS suture was then placed in a lateral to medial fashion through this ligament, reefed up to the posterior colpotomy site, and back down and through the uterosacral ligament and held.  This was repeated on the contralateral side.  With tension taken off the cuff and pushed down towards the sacrum, both sutures were tied down without any suture bridging noted with excellent elevation of the apex.  No entrapment of bowel or any areas for herniation were noted.    Cystourethroscopy was then performed. The Guo catheter was removed and a 70 degree cystoscope was placed into the bladder under direct visualization and the bladder was backfilled with sterile water. A 360 degree survey of the bladder was then performed with the above findings noted, no sutures, mesh or urothelial defects visualized, and excellent bilateral ureteral efflux with pyridium-stained urine. The bladder was drained of all cystoscopic fluid, the cystoscope removed, and the Guo catheter replaced.     All instruments were then removed and the robot undocked. All trocars were then removed under direct visualization and skin closed at all sites with 4-0 monocryl subcuticular suture and dermabond. Excellent hemostasis was noted.      All counts were correct.  The patient was then wakened from general anesthesia easily, and brought to the PACU in stable condition. Anticipate  discharge home later today.         Alex John MD, FACOG  Female Pelvic Medicine and Reconstructive Surgery  Department of Obstetrics and Gynecology  [facility]

## 2022-11-23 NOTE — ANESTHESIA POSTPROCEDURE EVALUATION
Patient: Remy Gallegos    Procedure Summary     Date: 11/23/22 Room / Location: [place] [address] / SURGERY [facility]    Anesthesia Start: 0735 Anesthesia Stop: 1001    Procedures:       ROBOTIC ASSISTED LAPAROSCOPIC HYSTERECTOMY (Uterus)      SALPINGO-OOPHORECTOMY, BILATERAL, LAPAROSCOPIC (Bilateral: Uterus)      COLPOPEXY, ROBOT-ASSSITED LAPAROSCOPIC (Abdomen)      CYSTOSCOPY (Bladder)      BIOPSY, VULVA, VAGINA, OR PERINEUM (Vagina ) Diagnosis: (INCOMPLETE UTEROVAGINAL PROLAPSE, CERVICAL ELONGATION, CYSTOCELE, MIDLINE RECTOCELE STRESS URINARY INCONTINENCE, INCOMPLETE BLADDER EMPTYING)    Surgeons: Alex John M.D. Responsible Provider: Navjot Sanchez M.D.    Anesthesia Type: general ASA Status: 2          Final Anesthesia Type: general  Last vitals  BP   Blood Pressure : 128/62    Temp   36.4 °C (97.6 °F)    Pulse   75   Resp   (!) 11    SpO2   94 %      Anesthesia Post Evaluation    Patient location during evaluation: PACU  Patient participation: complete - patient participated  Level of consciousness: awake and alert  Pain score: 3    Airway patency: patent  Anesthetic complications: no  Cardiovascular status: hemodynamically stable  Respiratory status: acceptable  Hydration status: euvolemic    PONV: none          No notable events documented.     Nurse Pain Score: 3 (NPRS)

## 2022-11-23 NOTE — OR NURSING
0958: Pt arrived from OR, handoff received from anesthesiologist and RN. Patient drowsy, moving all extremities. Five lap sites to abdomen with dermabond, one site with scant drainage, remaining sites C/D/I. VSSS. Guo drainage bright orange/clear urine.     1015: Patient medicated for pain per MAR. Tolerating oral intake of water.     1040: Patient's daughter updated on status.     1100: Continuing to medicate for pain.     1115: Recovery complete.     1135: Report given to phase 2 Ricarda MOREL.    36.9

## 2022-11-23 NOTE — OR NURSING
1140 Patient arrived from Phase 1 PACU, report received from MARIA TERESA Nair. Patient is on 1L NC, titrated down to 0.5L, saturation readings above 90%, incentive spirometer given and correct demonstration seen by RN.     1150 Patient titrated off oxygen, staying above 92%. Encouraged cough and deep breathing. Water given. No further needs at this time.     1210 backfill/voiding trial process explained to patient     1230 Patient provided with allie crackers. No nausea.     1300 VSS, no needs at this time.    1330 VSS, no needs at this time.     1400 Patient ambulating, steady gait.     1430 Voiding trial complete, successfully voided 300 mL out.     1500 Patient dressed by self, dc instructions reviewed. Nephew at bedside.    1520 PIV removed, VSS.     1525 Patient discharged home with son. Wheeled out by RN.

## 2022-11-23 NOTE — ANESTHESIA PROCEDURE NOTES
Airway    Date/Time: 11/23/2022 7:44 AM  Performed by: Navjot Sanchez M.D.  Authorized by: Navjot Sanchez M.D.     Location:  OR  Urgency:  Elective  Indications for Airway Management:  Anesthesia      Spontaneous Ventilation: absent    Sedation Level:  Deep  Preoxygenated: Yes    Patient Position:  Sniffing  Mask Difficulty Assessment:  1 - vent by mask  Final Airway Type:  Endotracheal airway  Final Endotracheal Airway:  ETT  Cuffed: Yes    Technique Used for Successful ETT Placement:  Direct laryngoscopy    Insertion Site:  Oral  Blade Type:  Lopez  Laryngoscope Blade/Videolaryngoscope Blade Size:  2  ETT Size (mm):  7.0  Measured from:  Lips  ETT to Lips (cm):  22  Placement Verified by: auscultation, capnometry and palpation of cuff    Cormack-Lehane Classification:  Grade I - full view of glottis  Number of Attempts at Approach:  1

## 2022-11-23 NOTE — DISCHARGE INSTRUCTIONS
HOME CARE INSTRUCTIONS    ACTIVITY: Rest and take it easy for the first 24 hours.  A responsible adult is recommended to remain with you during that time.  It is normal to feel sleepy.  We encourage you to not do anything that requires balance, judgment or coordination.    FOR 24 HOURS DO NOT:  Drive, operate machinery or run household appliances.  Drink beer or alcoholic beverages.  Make important decisions or sign legal documents.    SPECIAL INSTRUCTIONS: Post-op: What to expect after your surgery    For urgent post-operative questions or concerns, please call Dr. John's direct on-call cell line at [phone].     For less-urgent matters (Monday - Friday), you may send a message through [employer] or call the general Women's Health line at [phone] [phone].     Bladder function  Try to empty your bladder (urinate) at regular intervals by sitting on the toilet and relaxing.  You may need to adjust your positioning (lean forward or back) to empty the bladder fully. It is important that you do not push or strain to empty your bladder.     Call the surgeon at the number above if you cannot urinate. Also, call for treatment if you have signs and symptoms of a urinary tract infection, including:   Burning with urination  Bladder pain  Worsening need to urinate right away  Urine with a bad smell    If you cannot urinate within 8 hours with bladder discomfort, please call me at the number above. There is a 5% chance you may still have trouble urinating.     Vaginal care:   Do not go swimming, take sitting baths, or have sexual intercourse for 6 weeks after surgery. Do not place anything in the vagina except vaginal estrogen cream, if instructed to do so by your surgeon.     Vaginal discharge and bleeding/spotting is also normal through the entire 6-week recovery. Sometimes small sutures will fall out of the vagina as they dissolve. This is normal. Contact the office with any heavy bleeding soaking through pads, bad-smelling  discharge, or worsening pain.     Pain management:  Surgical pain is controlled in most patients with only non-steroidal anti-inflammatory drugs (NSAIDs, such as ibuprofen, “Advil”), and acetaminophen (Tylenol). These drugs can be taken together without interaction. In the hospital, your nurse will give you these medications at regular intervals to both treat and to prevent pain. If these are not controlling your pain, you may ask the nurse for additional medication. When you go home, you will also take NSAIDs and acetaminophen for pain management. Sometimes, a short course of narcotics such as oxycodone and hydromorphone is are required. We do not recommend using narcotics regularly as it can lead to constipation or dizziness, and falls.     Do not drive or operate heavy machinery while using narcotics. You are unlikely to become addicted if you need to take a narcotic medication a few times within the first week of your surgery.  After the first few days to one week, your pain should decrease and you should not have pain severe enough to need narcotics. If you continue having severe pain, contact your surgeon for re-evaluation.     Abdominal wound care  The incisions on your abdomen will be closed with either small bandages or a surgical glue. There are also tiny dissolving sutures beneath the skin. You can shower with these in place. In the shower, let the soapy water run over your incisions. Do not scrub or wipe your incisions. Keep the incisions dry for the remainder of the day/night. The bandages will fall off on their own, or you can remove them after at least 3 days if they become discolored or dirty. The glue will also fall off on its own after a few weeks. Small sutures that pop out through the skin are normal and will dissolve over time.    Call us if you feel increasing pain, redness, discharge or warmth at the incision.     Bowel function  Constipation is common after surgery. This means it may take  several days before having a normal bowel movement. It is important to take extra steps to keep your stools soft to avoid straining with bowel movements. Straining may damage the prolapse repair before it has healed. Most patients will be given a prescription for a stool softener (docusate) as well as a gentle laxative (Miralax or lactulose). These mediations adds water to the stool to make it easier to pass. Take them daily throughout your recovery. Hold for a day if you develop diarrhea.     Call us if you experience any repeated episodes of vomiting, worsening abdominal pain/bloating, or are unable to have a bowel movement for more than 3 days.     Activity restrictions  During the first 6 weeks avoid any type of heavy lifting that requires you to strain.  Gentle walking is good exercise. Start with about 10 minutes a day when you feel ready and build up gradually. Avoid repetitive squatting or bending at the waist.Avoid any fitness-type training, aerobics, etc. for at least 6 weeks after surgery. Generally, you will need 4-6 weeks off work. This period may be longer if you have a very physical job.    Return to sex  When your surgeon clears you to resume sex (if desired), begin slowly and to use enough lubrication to help ease the discomfort. Because your vagina and pelvis have been re-structured, and it can take time to get used to sex after surgery. As scar tissue softens over time you will feel less discomfort. If discomfort does not go away, contact the office to schedule an exam and to discuss other options. In some cases, your surgeon may prescribe a low dose of vaginal estrogen to help with vaginal dryness and pain that may happen with sex.      DIET: To avoid nausea, slowly advance diet as tolerated, avoiding spicy or greasy foods for the first day.  Add more substantial food to your diet according to your physician's instructions.   INCREASE FLUIDS AND FIBER TO AVOID CONSTIPATION.      MEDICATIONS:  Resume taking daily medication.  Take prescribed pain medication with food.  If no medication is prescribed, you may take non-aspirin pain medication if needed.  PAIN MEDICATION CAN BE VERY CONSTIPATING.  Take a stool softener or laxative such as senokot, pericolace, or milk of magnesia if needed.    Last pain medication given at 10:15 am.    You should CALL YOUR PHYSICIAN if you develop:  Fever greater than 101 degrees F.  Pain not relieved by medication, or persistent nausea or vomiting.  Excessive bleeding (blood soaking through dressing) or unexpected drainage from the wound.  Extreme redness or swelling around the incision site, drainage of pus or foul smelling drainage.  Inability to urinate or empty your bladder within 8 hours.  Problems with breathing or chest pain.    You should call 911 if you develop problems with breathing or chest pain.  If you are unable to contact your doctor or surgical center, you should go to the nearest emergency room or urgent care center.  Physician's telephone #: [phone] Dr John    MILD FLU-LIKE SYMPTOMS ARE NORMAL.  YOU MAY EXPERIENCE GENERALIZED MUSCLE ACHES, THROAT IRRITATION, HEADACHE AND/OR SOME NAUSEA.    If any questions arise, call your doctor.  If your doctor is not available, please feel free to call the Surgical Center at [phone].  The Center is open Monday through Friday from 7AM to 7PM.      A registered nurse may call you a few days after your surgery to see how you are doing after your procedure.    You may also receive a survey in the mail within the next two weeks and we ask that you take a few moments to complete the survey and return it to us.  Our goal is to provide you with very good care and we value your comments.     Depression / Suicide Risk    As you are discharged from this [facility] facility, it is important to learn how to keep safe from harming yourself.    Recognize the warning signs:  Abrupt changes in personality, positive or  negative- including increase in energy   Giving away possessions  Change in eating patterns- significant weight changes-  positive or negative  Change in sleeping patterns- unable to sleep or sleeping all the time   Unwillingness or inability to communicate  Depression  Unusual sadness, discouragement and loneliness  Talk of wanting to die  Neglect of personal appearance   Rebelliousness- reckless behavior  Withdrawal from people/activities they love  Confusion- inability to concentrate     If you or a loved one observes any of these behaviors or has concerns about self-harm, here's what you can do:  Talk about it- your feelings and reasons for harming yourself  Remove any means that you might use to hurt yourself (examples: pills, rope, extension cords, firearm)  Get professional help from the community (Mental Health, Substance Abuse, psychological counseling)  Do not be alone:Call your Safe Contact- someone whom you trust who will be there for you.  Call your local CRISIS HOTLINE 544-1616 or 403-997-5891  Call your local Children's Mobile Crisis Response Team [place] (080) 744-5021 or www.25eight  Call the toll free National Suicide Prevention Hotlines   National Suicide Prevention Lifeline 527-655-ETZB (4432)  National Hope Line Network 800-SUICIDE (821-1512)    I acknowledge receipt and understanding of these Home Care instructions.

## 2022-11-25 ENCOUNTER — TELEPHONE (OUTPATIENT)
Dept: OBGYN | Facility: CLINIC | Age: 77
End: 2022-11-25
Payer: MEDICARE

## 2022-11-26 NOTE — TELEPHONE ENCOUNTER
Post-operative phone call    I called Ms. Gallegos and confirmed her identity. She is POD2 s/p robotic TLH/BSO/USLS/NH.     She reports doing well, pain controlled, ambulating, tolerating regular diet, passing gas, having normal bowel movement and emptying her bladder well. Denies nausea, vomiting, fever, chills, SOB, heavy vaginal bleeding.   All questions answered.     She will follow up as scheduled in 6 weeks, or earlier if any issues arise.     Alex John MD

## 2023-01-05 ENCOUNTER — GYNECOLOGY VISIT (OUTPATIENT)
Dept: OBGYN | Facility: CLINIC | Age: 78
End: 2023-01-05
Payer: MEDICARE

## 2023-01-05 VITALS
WEIGHT: 125 LBS | HEART RATE: 73 BPM | DIASTOLIC BLOOD PRESSURE: 80 MMHG | SYSTOLIC BLOOD PRESSURE: 160 MMHG | BODY MASS INDEX: 20.8 KG/M2

## 2023-01-05 DIAGNOSIS — Z98.890 POSTOPERATIVE STATE: ICD-10-CM

## 2023-01-05 DIAGNOSIS — L90.0 LICHEN SCLEROSUS: ICD-10-CM

## 2023-01-05 PROCEDURE — 99212 OFFICE O/P EST SF 10 MIN: CPT | Mod: 24 | Performed by: STUDENT IN AN ORGANIZED HEALTH CARE EDUCATION/TRAINING PROGRAM

## 2023-01-05 PROCEDURE — 99024 POSTOP FOLLOW-UP VISIT: CPT | Performed by: STUDENT IN AN ORGANIZED HEALTH CARE EDUCATION/TRAINING PROGRAM

## 2023-01-05 ASSESSMENT — FIBROSIS 4 INDEX: FIB4 SCORE: 0.96

## 2023-01-05 NOTE — PROGRESS NOTES
Patient here for Post Op Exam  Surgery Date: 11/23/2022  PT States no complication or concerns   PVR : 46 mL  Good #: [phone] (home)   Pharmacy Verified

## 2023-01-05 NOTE — PROGRESS NOTES
Urogynecology and Pelvic Reconstructive Surgery Follow Up    Remy Gallegos [id number] :1945    Referred by: Tan Beckett MD    Reason for Visit:   Chief Complaint   Patient presents with    Other     Post Op          Subjective     History of Presenting Illness:    Ms.Elanna Gallegos is a 77 y.o. year old P3 who presents for follow up 5 weeks s/p Ra TLH/BSO/USLS.     She was having excellent recovery with minimal pain.  She has no recurrent prolapse symptoms, her bladder emptying is significantly improved, and has no new incontinence symptoms.    Path from uterus/BSO was benign, vulvar biopsy confirmed lichen sclerosis     Does, however have worsening vaginal/vulvar itching which consistent with her biopsy-proven lichen sclerosis.  Not using clobetasol recently as she feels like gives her insomnia      Initial HPI: She was referred by her urologist Dr. Beckett for the evaluation and management of prolapse.     She is bothered by prolapse, difficulty emptying her bladder.  She said prolapse for 5 to 6 years which is slowly worsening.  She is interested in definitive management at this time and has been told about pessary, which she would prefer to avoid.    She also has a history of significant vaginal irritation and itching, with no prior treatments.  She also notes vaginal discharge, not foul-smelling.    History noted that on CT imaging from 2021 a left exophytic uterine fibroid versus ovarian mass was noted.  No follow-up imaging is yet been performed.     Prior Pelvic surgery:   Tubal ligation  22: Robotic TLH/BSO, USLS, vulvar biopsy ([facility])     Prior treatment:   Clobetasol  Vaginal estrogen -pre scribed but not used       Pelvic floor symptom review:     Bladder:    Voids per day: 10+ --> improved improved voids per night: 2   -->    Urinary incontinence episodes per day: none   Bladder emptying: Incomplete --> complete   Voiding symptoms: Resolved   UTI in last 12 months: 2 in last  year   Other urologic history:       Prolapse:     Prolapse symptoms: Resolved     Bowel:    Constipation: No  - h/o colitis   Bowel movements per day: varies    Straining to empty bowels: No    Splinting to evacuate: No    Painful evacuation: No    Difficulty emptying rectum: No    Incontinence to stool: No   Incontinence to gas: No     Blood in stool: no   Hemorrhoids: No    Bowel conditions: colitis, unspecified        Sexual function:    Sexually active: No  - not since 49    Pain with intercourse: No       Pelvic Pain: No      Past medical and surgical history    Past obstetric history   Number of vaginal deliveries: 3   Number of  deliveries: 0    Past gynecological history:    Last menstrual period: postmenopausal, no PMB   History of endometrial polyps:  No    History of endometriosis: No    History of cervical dysplasia: No     Last pap: 65yo       Past medical history:  Past Medical History:   Diagnosis Date    Anesthesia     slow to wake up in the past    High cholesterol     Hyperlipidemia      Past surgical history:  Past Surgical History:   Procedure Laterality Date    DE LAP,RMV  ADNEXAL STRUCTURE Bilateral 2022    Procedure: SALPINGO-OOPHORECTOMY, BILATERAL, LAPAROSCOPIC;  Surgeon: Alex John M.D.;  Location: [facility];  Service: Gyn Robotic    DE CYSTOURETHROSCOPY N/A 2022    Procedure: CYSTOSCOPY;  Surgeon: Alex John M.D.;  Location: SURGERY [facility];  Service: Gyn Robotic    HYSTERECTOMY ROBOTIC XI N/A 2022    Procedure: ROBOTIC ASSISTED LAPAROSCOPIC HYSTERECTOMY;  Surgeon: Alex John M.D.;  Location: SURGERY [facility];  Service: Gyn Robotic    VAGINAL SUSPENSION N/A 2022    Procedure: COLPOPEXY, ROBOT-ASSSITED LAPAROSCOPIC;  Surgeon: Alex John M.D.;  Location: SURGERY [facility];  Service: Gyn Robotic    BIOPSY, VULVA, VAGINA, OR PERINEUM N/A 2022    Procedure: BIOPSY, VULVA, VAGINA, OR PERINEUM;  Surgeon: Alex John M.D.;   Location: SURGERY [facility];  Service: Gyn Robotic    OTHER      cysts removed from under both arms    TUBAL COAGULATION LAPAROSCOPIC BILATERAL       Medications:has a current medication list which includes the following prescription(s): ibuprofen, acetaminophen, polyethylene glycol 3350, atorvastatin, tramadol hcl, and clobetasol.  Allergies:Patient has no allergy information on record.  Family history:No family history on file.  Social history: reports that she has been smoking cigarettes. She has a 30.00 pack-year smoking history. She has never used smokeless tobacco. She reports that she does not drink alcohol and does not use drugs.    Review of systems: A full review of systems was performed, and negative with the exception of want is noted above in the HPI.        Objective        BP (!) 160/80 (BP Location: Left arm, Patient Position: Sitting, BP Cuff Size: Adult)   Pulse 73   Wt 125 lb   BMI 20.80 kg/m²     Physical Exam  Vitals reviewed. Exam conducted with a chaperone present (MA - see notes.).   Constitutional:       Appearance: Normal appearance.   HENT:      Head: Normocephalic.      Mouth/Throat:      Mouth: Mucous membranes are moist.   Cardiovascular:      Rate and Rhythm: Normal rate.   Pulmonary:      Effort: Pulmonary effort is normal.   Abdominal:      Palpations: Abdomen is soft. There is no mass.      Tenderness: There is no abdominal tenderness.   Skin:     General: Skin is warm and dry.   Neurological:      Mental Status: She is alert.   Psychiatric:         Mood and Affect: Mood normal.       Genitourinary:    External female genitalia: WNL   Vulva: Lichen Sclerosis she has active lichen sclerosus changes with an increased reddening/inflammation of the labia majora and perianal tissues and a figure-of-eight pattern.  This is worse than prior examinations which had a more lacy reticular pattern.   Bulbocavernosus reflex: Intact   Anal wink reflex: Intact   Perineal sensation:  WNL   Urethra: Caruncle   Vagina: Atrophic -cuff intact, small amount of V-Loc suture visible   Atrophy: Severe   Cough stress test: Negative; positive with apex reduced     Pelvic floor:    POP-Q post op : Aa -2 / Ba -2 / TVL 9 / C -7 / D -3 / Ap -2 / Bp -2 / GH 3 / PB 2     Procedure Performed:     Urodynamics (prior)    Filling phase: Mild RAI at capacity, no uninhibited detrusor contractions, normal capacity, normal compliance, normal mid urethral closure pressure  Voiding phase: Dysfunctional voiding with Valsalva effort, incomplete emptying on initial uroflow, somewhat improved after elevation on pressure flow, minimal detrusor contraction.    Diagnostic test and records review:      Post-void residual: Postop: 46 mL  performed by Bladder Scanner    Labs:       Documentation reviewed: Prior EMR Records    Outside records reviewed: 3 pages           Assessment & Plan     Ms.Elanna Gallegos is a 77 y.o. year old P3 with significant uterine prolapse with cervical elongation, lichen sclerosis, atrophy, overactive bladder.     1. Incomplete uterovaginal prolapse with cervical elongation now status post TLH/BSO/USO S  -She has made excellent recovery, symptoms improved, no recurrent prolapse, improvement in bladder symptoms  -Return to all normal activities, she is not sexually active but should wait least 3 weeks before returning to intercourse if desired    5. Pelvic mass  - Benign surgical path -no follow-up needed she prefers to defer estrogen therapy at this time, she only needs to use in the future if she has worsening internal itching, dyspareunia, recurrent UTIs    6. Atrophic vaginitis, frequent UTI  She has vaginal atrophy on examination. Reviewed risks, benefits, and indications for vaginal estrogen therapy.  Continue with vaginal estrogen therapy twice weekly.       7. Lichen sclerosus  -Active flare of lichen sclerosis noted today, I encouraged her to restart clobetasol to be used a small amount  externally for 3 months.  Ideally I would do twice a day dosing but due to her insomnia she can use this in the mornings and monitor for improvement    8. OAB (overactive bladder)  9. Nocturia  -Improved after prolapse repair.  Continue to monitor      Follow-up in 3 months for review of lichen sclerosus changes               Alex John MD, FACOG    Female Pelvic Medicine and Reconstructive Surgery  Department of Obstetrics and Gynecology  [facility]      This medical record contains text that has been entered with the assistance of computer voice recognition and dictation software.  Therefore, it may contain unintended errors in text, spelling, punctuation, or grammar

## 2023-04-14 ENCOUNTER — TELEPHONE (OUTPATIENT)
Dept: OBGYN | Facility: CLINIC | Age: 78
End: 2023-04-14
Payer: MEDICARE

## 2023-04-14 DIAGNOSIS — L90.0 LICHEN SCLEROSUS: ICD-10-CM

## 2023-04-14 RX ORDER — CLOBETASOL PROPIONATE 0.5 MG/G
CREAM TOPICAL
Qty: 1 EACH | Refills: 3 | Status: SHIPPED | OUTPATIENT
Start: 2023-04-14 | End: 2023-04-18 | Stop reason: SDUPTHER

## 2023-04-14 NOTE — TELEPHONE ENCOUNTER
----- Message from Alex John M.D. sent at 4/14/2023 12:35 PM PDT -----  Regarding: call patient  JELLY Watts,     I tried to send a message back to one of the MAs, but got lost to my inbox.  Could you please call this patient and let her know I sent a new prescription for clobetasol to treat her lichen sclerosis.  I am changing her regimen due to her persistence of symptoms.  She will take twice daily for 1 month, once daily for 1 month, then twice per week indefinitely to try to suppress symptoms.      Thank you!  KF      04/14/23  [id number] called pt and notified as above. Pt agreed and verbalized understanding.

## 2023-04-17 ENCOUNTER — TELEPHONE (OUTPATIENT)
Dept: OBGYN | Facility: CLINIC | Age: 78
End: 2023-04-17
Payer: MEDICARE

## 2023-04-17 NOTE — TELEPHONE ENCOUNTER
PT states she wants her Clobetasol cream sent to [email or web address] on [address] instead of original pharmacy of [facility]. . Encounter routed to Dr. John.

## 2023-04-18 DIAGNOSIS — L90.0 LICHEN SCLEROSUS: ICD-10-CM

## 2023-04-18 RX ORDER — CLOBETASOL PROPIONATE 0.5 MG/G
CREAM TOPICAL
Qty: 1 EACH | Refills: 3 | Status: SHIPPED | OUTPATIENT
Start: 2023-04-18

## 2023-09-27 NOTE — PROGRESS NOTES
Urogynecology and Pelvic Reconstructive Surgery Follow Up    Remy Gallegos MRN:[id number] :1945    Referred by: Tan Beckett MD    Reason for Visit:   Chief Complaint   Patient presents with    Follow-Up     Lichen          Subjective     History of Presenting Illness:    Ms.Elanna Gallegos is a 78 y.o. year old P3 who presents for follow up.       She has history of biopsy-proven lichen sclerosis.  Previously not using clobetasol recently as she feels like gives her insomnia, but restarted in January    She does not like using the betazole because she feels that it makes her more fatigued and feels generally less well, but she also continues to have itching flares.  She is using clobetasol only on a as needed basis with flare symptoms    She is also 11 months status post TLH/BSO/USLS/biopsy.  No recurrent prolapse symptoms or any other issues after surgery      Initial HPI: She was referred by her urologist Dr. Beckett for the evaluation and management of prolapse.     She is bothered by prolapse, difficulty emptying her bladder.  She said prolapse for 5 to 6 years which is slowly worsening.  She is interested in definitive management at this time and has been told about pessary, which she would prefer to avoid.    She also has a history of significant vaginal irritation and itching, with no prior treatments.  She also notes vaginal discharge, not foul-smelling.    History noted that on CT imaging from 2021 a left exophytic uterine fibroid versus ovarian mass was noted.  No follow-up imaging is yet been performed.     Prior Pelvic surgery:   Tubal ligation  22: Robotic TLH/BSO, USLS, vulvar biopsy ([facility])     Prior treatment:   Clobetasol  Vaginal estrogen -pre scribed but not used       Pelvic floor symptom review:     Bladder:    Voids per day: 10+ --> improved improved voids per night: 2   -->    Urinary incontinence episodes per day: none   Bladder emptying: Incomplete --> complete   Voiding  symptoms: Resolved   UTI in last 12 months: 2 in last year   Other urologic history:       Prolapse:     Prolapse symptoms: Resolved     Bowel:    Constipation: No  - h/o colitis   Bowel movements per day: varies    Straining to empty bowels: No    Splinting to evacuate: No    Painful evacuation: No    Difficulty emptying rectum: No    Incontinence to stool: No   Incontinence to gas: No     Blood in stool: no   Hemorrhoids: No    Bowel conditions: colitis, unspecified        Sexual function:    Sexually active: No  - not since 49    Pain with intercourse: No       Pelvic Pain: No      Past medical and surgical history    Past obstetric history   Number of vaginal deliveries: 3   Number of  deliveries: 0    Past gynecological history:    Last menstrual period: postmenopausal, no PMB   History of endometrial polyps:  No    History of endometriosis: No    History of cervical dysplasia: No     Last pap: 65yo       Past medical history:  Past Medical History:   Diagnosis Date    Anesthesia     slow to wake up in the past    High cholesterol     Hyperlipidemia      Past surgical history:  Past Surgical History:   Procedure Laterality Date    IN LAP,RMV  ADNEXAL STRUCTURE Bilateral 2022    Procedure: SALPINGO-OOPHORECTOMY, BILATERAL, LAPAROSCOPIC;  Surgeon: Alex John M.D.;  Location: [facility];  Service: Gyn Robotic    IN CYSTOURETHROSCOPY N/A 2022    Procedure: CYSTOSCOPY;  Surgeon: Alex John M.D.;  Location: [facility];  Service: Gyn Robotic    HYSTERECTOMY ROBOTIC XI N/A 2022    Procedure: ROBOTIC ASSISTED LAPAROSCOPIC HYSTERECTOMY;  Surgeon: Alex John M.D.;  Location: [facility];  Service: Gyn Robotic    VAGINAL SUSPENSION N/A 2022    Procedure: COLPOPEXY, ROBOT-ASSSITED LAPAROSCOPIC;  Surgeon: Alex John M.D.;  Location: [facility];  Service: Gyn Robotic    BIOPSY, VULVA, VAGINA, OR PERINEUM N/A 2022    Procedure: BIOPSY, VULVA,  VAGINA, OR PERINEUM;  Surgeon: Alex John M.D.;  Location: [facility];  Service: Gyn Robotic    OTHER      cysts removed from under both arms    TUBAL COAGULATION LAPAROSCOPIC BILATERAL       Medications:has a current medication list which includes the following prescription(s): triamcinolone acetonide, clobetasol, ibuprofen, acetaminophen, polyethylene glycol 3350, atorvastatin, and tramadol hcl.  Allergies:Patient has no allergy information on record.  Family history:No family history on file.  Social history: reports that she has been smoking cigarettes. She has a 30.0 pack-year smoking history. She has never used smokeless tobacco. She reports that she does not drink alcohol and does not use drugs.    Review of systems: A full review of systems was performed, and negative with the exception of want is noted above in the HPI.        Objective        BP (!) 146/89   Wt 138 lb 3.2 oz   BMI 23.00 kg/m²     Physical Exam  Vitals reviewed. Exam conducted with a chaperone present (MA - see notes.).   Constitutional:       Appearance: Normal appearance.   HENT:      Head: Normocephalic.      Mouth/Throat:      Mouth: Mucous membranes are moist.   Cardiovascular:      Rate and Rhythm: Normal rate.   Pulmonary:      Effort: Pulmonary effort is normal.   Abdominal:      Palpations: Abdomen is soft. There is no mass.      Tenderness: There is no abdominal tenderness.   Skin:     General: Skin is warm and dry.   Neurological:      Mental Status: She is alert.   Psychiatric:         Mood and Affect: Mood normal.         Genitourinary:    External female genitalia: WNL   Vulva: Lichen Sclerosis she has active lichen sclerosus changes with an increased reddening/inflammation of the labia majora and perianal tissues and a figure-of-eight pattern.  This is worse than prior examinations which had a more lacy reticular pattern.   Bulbocavernosus reflex: Intact   Anal wink reflex: Intact   Perineal sensation:  WNL   Urethra: Caruncle   Vagina: Atrophic -cuff intact, small amount of V-Loc suture visible   Atrophy: Severe   Cough stress test: Negative; positive with apex reduced     Pelvic floor:    POP-Q post op : Aa -2 / Ba -2 / TVL 9 / C -7 / D -3 / Ap -2 / Bp -2 / GH 3 / PB 2     Procedure Performed:     Urodynamics (prior)    Filling phase: Mild RAI at capacity, no uninhibited detrusor contractions, normal capacity, normal compliance, normal mid urethral closure pressure  Voiding phase: Dysfunctional voiding with Valsalva effort, incomplete emptying on initial uroflow, somewhat improved after elevation on pressure flow, minimal detrusor contraction.    Diagnostic test and records review:      Post-void residual: Postop: 46 mL  performed by Bladder Scanner    Labs:       Documentation reviewed: Prior EMR Records    Outside records reviewed: 3 pages           Assessment & Plan     Ms.Elanna Gallegos is a 78 y.o. year old P3 with significant uterine prolapse with cervical elongation, lichen sclerosis, atrophy, overactive bladder.     1. Incomplete uterovaginal prolapse with cervical elongation now status post TLH/BSO/USO S  -No recurrence, continue to monitor    2. Atrophic vaginitis, frequent UTI  She has vaginal atrophy on examination. Reviewed risks, benefits, and indications for vaginal estrogen therapy.  Continue with vaginal estrogen therapy twice weekly.       3. Lichen sclerosus  This is persistent with an active flare again seen on exam today.  I told her on treatment dosing daily for at least 1 month and going back to twice weekly.  I recommend trying a different high potency steroid other than clobetasol to see if this gives less side effects.  Prescription given for triamcinolone 0.5% ointment    8. OAB (overactive bladder)  9. Nocturia  -Recent increased frequency and urgency.  No classic UTI symptoms.  Urine culture sent to rule out bacteriuria as a possible cause      Follow-up in 3 months for review of  lichen sclerosus changes               Alex John MD, FACOG    Female Pelvic Medicine and Reconstructive Surgery  Department of Obstetrics and Gynecology  [facility]      This medical record contains text that has been entered with the assistance of computer voice recognition and dictation software.  Therefore, it may contain unintended errors in text, spelling, punctuation, or grammar

## 2023-09-28 ENCOUNTER — GYNECOLOGY VISIT (OUTPATIENT)
Dept: GYNECOLOGY | Facility: CLINIC | Age: 78
End: 2023-09-28
Payer: MEDICARE

## 2023-09-28 VITALS — WEIGHT: 138.2 LBS | BODY MASS INDEX: 23 KG/M2 | SYSTOLIC BLOOD PRESSURE: 146 MMHG | DIASTOLIC BLOOD PRESSURE: 89 MMHG

## 2023-09-28 DIAGNOSIS — L90.0 LICHEN SCLEROSUS: ICD-10-CM

## 2023-09-28 DIAGNOSIS — R39.9 UTI SYMPTOMS: ICD-10-CM

## 2023-09-28 PROCEDURE — 99214 OFFICE O/P EST MOD 30 MIN: CPT | Performed by: STUDENT IN AN ORGANIZED HEALTH CARE EDUCATION/TRAINING PROGRAM

## 2023-09-28 PROCEDURE — 3079F DIAST BP 80-89 MM HG: CPT | Performed by: STUDENT IN AN ORGANIZED HEALTH CARE EDUCATION/TRAINING PROGRAM

## 2023-09-28 PROCEDURE — 3077F SYST BP >= 140 MM HG: CPT | Performed by: STUDENT IN AN ORGANIZED HEALTH CARE EDUCATION/TRAINING PROGRAM

## 2023-09-28 RX ORDER — TRIAMCINOLONE ACETONIDE 1 MG/G
OINTMENT TOPICAL
Qty: 1 EACH | Refills: 3 | Status: SHIPPED | OUTPATIENT
Start: 2023-09-28

## 2023-10-10 ENCOUNTER — TELEPHONE (OUTPATIENT)
Dept: OBGYN | Facility: CLINIC | Age: 78
End: 2023-10-10
Payer: MEDICARE

## 2023-10-10 NOTE — TELEPHONE ENCOUNTER
Pt called for the results to her urine culture I apologized and stated I don't see a urine received. Pt stated she will [facility] to a [facility] and [facility] off a urine.

## 2024-03-06 NOTE — ED NOTES
All lines and monitors discontinued. Discharge instructions given, questions answered.    Ambulated out of ER, escorted by RN.  Instructed not to drive after taking pain medication and pt verbalizes understanding.  Rx x 0 given.     PMD

## 2024-03-21 ENCOUNTER — HOSPITAL ENCOUNTER (OUTPATIENT)
Dept: RADIOLOGY | Facility: MEDICAL CENTER | Age: 79
End: 2024-03-21
Payer: COMMERCIAL

## 2024-03-21 DIAGNOSIS — M25.511 PAIN IN JOINT OF RIGHT SHOULDER: ICD-10-CM

## 2024-03-21 DIAGNOSIS — M25.512 LEFT SHOULDER PAIN, UNSPECIFIED CHRONICITY: ICD-10-CM

## 2024-03-21 PROCEDURE — 73030 X-RAY EXAM OF SHOULDER: CPT | Mod: RT

## 2024-04-17 ENCOUNTER — APPOINTMENT (OUTPATIENT)
Dept: LAB | Facility: MEDICAL CENTER | Age: 79
End: 2024-04-17
Payer: MEDICARE

## 2024-04-18 ENCOUNTER — HOSPITAL ENCOUNTER (OUTPATIENT)
Dept: RADIOLOGY | Facility: MEDICAL CENTER | Age: 79
End: 2024-04-18
Payer: COMMERCIAL

## 2024-04-18 DIAGNOSIS — C06.0 SQUAMOUS CELL CARCINOMA OF BUCCAL MUCOSA (HCC): ICD-10-CM

## 2024-04-18 PROCEDURE — 700117 HCHG RX CONTRAST REV CODE 255

## 2024-04-18 PROCEDURE — 70491 CT SOFT TISSUE NECK W/DYE: CPT

## 2024-04-18 RX ADMIN — IOHEXOL 80 ML: 350 INJECTION, SOLUTION INTRAVENOUS at 15:29

## 2024-04-23 ENCOUNTER — HOSPITAL ENCOUNTER (OUTPATIENT)
Dept: RADIOLOGY | Facility: MEDICAL CENTER | Age: 79
End: 2024-04-23
Payer: COMMERCIAL

## 2024-04-23 DIAGNOSIS — C06.0 SQUAMOUS CELL CARCINOMA OF BUCCAL MUCOSA (HCC): ICD-10-CM

## 2024-04-23 PROCEDURE — A9552 F18 FDG: HCPCS

## 2024-04-29 DIAGNOSIS — L90.0 LICHEN SCLEROSUS: ICD-10-CM

## 2024-04-29 RX ORDER — TRIAMCINOLONE ACETONIDE 1 MG/G
OINTMENT TOPICAL
Qty: 15 G | Refills: 3 | Status: SHIPPED | OUTPATIENT
Start: 2024-04-29

## 2024-06-27 ENCOUNTER — TELEPHONE (OUTPATIENT)
Dept: OBGYN | Facility: CLINIC | Age: 79
End: 2024-06-27
Payer: COMMERCIAL

## 2024-06-27 RX ORDER — SULFAMETHOXAZOLE AND TRIMETHOPRIM 800; 160 MG/1; MG/1
1 TABLET ORAL 2 TIMES DAILY
Qty: 6 TABLET | Refills: 0 | Status: SHIPPED | OUTPATIENT
Start: 2024-06-27 | End: 2024-06-30

## 2024-06-27 NOTE — TELEPHONE ENCOUNTER
----- Message from Alex John M.D. sent at 6/27/2024 12:52 PM PDT -----  Regarding: RE: Appt needed  Any established patient of mine can be scheduled for a next available f/u visit.     However, this may be a UTI. Since she lives in [place] and the weekend coming up, we can send over a treatment to see if this helps.     Alex John MD  ----- Message -----  [facility]  Sent: 6/27/2024  11:07 AM PDT  To: Alex John M.D.  Subject: Appt needed                                      Good Morning Remy Hearn just called saying she's having a lot of pressure on her bladder and its very uncomfortable. I reached out to Jerica and she suggested that I send you a message to see when we could possibly get her in to be seen. Please let me know when you would like me to get her scheduled and I'll give her a call. Thank you so much!    [facility]

## 2025-05-02 NOTE — ED TRIAGE NOTES
"Chief Complaint   Patient presents with   • Abdominal Pain   • N/V     PT BIB [facility] from home. Pt reports experiencing severe abd pain, N/V after eating dinner. Pt reorts to having colitis about one month ago with similar symptoms. EMS administered 100 mcg Fent, 4mg Zofran. Pt A&O4.    /90   Pulse 77   Temp 36.7 °C (98 °F) (Temporal)   Resp 16   Ht 1.651 m (5' 5\")   Wt 64.4 kg (142 lb)   SpO2 95%     "
No

## (undated) DEVICE — JELLY SURGILUBE STERILE TUBE 4.25 OZ (1/EA)

## (undated) DEVICE — CANISTER SUCTION 3000ML MECHANICAL FILTER AUTO SHUTOFF MEDI-VAC NONSTERILE LF DISP  (40EA/CA)

## (undated) DEVICE — PAD OR TABLE DA VINCI 2IN X 20IN X 72IN - (12EA/CA)

## (undated) DEVICE — DRAPE ARM  BOX OF 20

## (undated) DEVICE — SODIUM CHL IRRIGATION 0.9% 1000ML (12EA/CA)

## (undated) DEVICE — SUTURE 2-0 20CM STRATAFIX SPIRAL SH NEEDLE (12/BX)

## (undated) DEVICE — SET SUCTION/IRRIGATION WITH DISPOSABLE TIP (6/CA )PART #0250-070-520 IS A SUB

## (undated) DEVICE — PAD LAP STERILE 18 X 18 - (5/PK 40PK/CA)

## (undated) DEVICE — SUTURE 0 VICRYL PLUS CT-2 - 27 INCH (36/BX)

## (undated) DEVICE — SUTURE 2-0 VICRYL PLUS SH - 27 INCH (36/BX)

## (undated) DEVICE — TUBE E-T HI-LO CUFF 7.0MM (10EA/PK)

## (undated) DEVICE — SET TUBING PNEUMOCLEAR HIGH FLOW SMOKE EVACUATION (10EA/BX)

## (undated) DEVICE — ELECTRODE DUAL RETURN W/ CORD - (50/PK)

## (undated) DEVICE — GLOVE BIOGEL INDICATOR SZ 7.5 SURGICAL PF LTX - (50PR/BX 4BX/CA)

## (undated) DEVICE — TUBING CLEARLINK DUO-VENT - C-FLO (48EA/CA)

## (undated) DEVICE — SPONGE GAUZESTER 4 X 4 4PLY - (128PK/CA)

## (undated) DEVICE — COVER LIGHT HANDLE ALC PLUS DISP (18EA/BX)

## (undated) DEVICE — SENSOR OXIMETER ADULT SPO2 RD SET (20EA/BX)

## (undated) DEVICE — PACK GYN DAVINCI (2EA/CA)

## (undated) DEVICE — WATER IRRIG. STER 3000 ML - (4/CA)

## (undated) DEVICE — PACK MINOR BASIN - (2EA/CA)

## (undated) DEVICE — GOWN WARMING STANDARD FLEX - (30/CA)

## (undated) DEVICE — SUTURE GENERAL

## (undated) DEVICE — DRAPE COLUMN  BOX OF 20

## (undated) DEVICE — NEEDLE MAYO CATGUT TPR POINT - (2EA/PK20PK/BX)

## (undated) DEVICE — SLEEVE VASO CALF MED - (10PR/CA)

## (undated) DEVICE — BRIEF STRETCH MATERNITY M/L - FITS 20-60IN (5EA/BG 20BG/CA)

## (undated) DEVICE — PACK TRENGUARD 450 PROCEDURE (12EA/CA)

## (undated) DEVICE — FORCEP BIPOLAR MARYLAND DA VINCI 10X'S REUSABLE (10UN/EA)

## (undated) DEVICE — TOWEL STOP TIMEOUT SAFETY FLAG (40EA/CA)

## (undated) DEVICE — SUTURE 2-0 PDS PLUS SH 27 (36EA/BX)"

## (undated) DEVICE — SUTURE 2-0 PROLENE SH (36PK/BX)

## (undated) DEVICE — APPLICATOR ENDOSCOPIC SURGICEL (5EA/BX)

## (undated) DEVICE — GLOVE BIOGEL SZ 7 SURGICAL PF LTX - (50PR/BX 4BX/CA)

## (undated) DEVICE — CANNULA W/SEAL 5X100 Z-THRE - ADED KII (12/BX)

## (undated) DEVICE — Device

## (undated) DEVICE — OBTURATOR BLADELESS STANDARD 8MM (6EA/BX)

## (undated) DEVICE — UTERINE MANIP V-CARE STANDARD DAVINCI (8EA/CA)

## (undated) DEVICE — SET LEADWIRE 5 LEAD BEDSIDE DISPOSABLE ECG (1SET OF 5/EA)

## (undated) DEVICE — GLOVE BIOGEL SZ 7.5 SURGICAL PF LTX - (50PR/BX 4BX/CA)

## (undated) DEVICE — COVER FOOT UNIVERSAL DISP. - (25EA/CA)

## (undated) DEVICE — SUTURE 2-0 STRATAFIX SPIRAL PDS SH (12EA/BX)

## (undated) DEVICE — ARMREST CRADLE FOAM - (2PR/PK 12PR/CA)

## (undated) DEVICE — SUTURE 4-0 MONOCRYL PLUS PS-2 - 27 INCH (36/BX)

## (undated) DEVICE — ROBOTIC SURGERY SERVICES

## (undated) DEVICE — PAD SANITARY 11IN MAXI IND WRAPPED  (12EA/PK 24PK/CA)

## (undated) DEVICE — LACTATED RINGERS INJ 1000 ML - (14EA/CA 60CA/PF)

## (undated) DEVICE — SUTURE 0 VICRYL PLUS UR-6 - 27 INCH (36/BX)

## (undated) DEVICE — SET EXTENSION WITH 2 PORTS (48EA/CA) ***PART #2C8610 IS A SUBSTITUTE*****

## (undated) DEVICE — RINGDISP RETRACTOR LONESTAR

## (undated) DEVICE — SYSTEM CLEARIFY VISUALIZATION (10EA/PK)

## (undated) DEVICE — SUTURE 4-0 VICRYL PLUS SH - UNDYED 27 INCH (36PK/BX)

## (undated) DEVICE — TRAY SURESTEP FOLEY TEMP SENSING 16FR (10EA/CA) ORDER  #18764 FOR TEMP FOLEY ONLY

## (undated) DEVICE — SUCTION INSTRUMENT YANKAUER BULBOUS TIP W/O VENT (50EA/CA)

## (undated) DEVICE — SCRUB SOLUTION EXIDINE 4% 40Z - 48/CS CHLORAHEXADINE GLUCONATE

## (undated) DEVICE — HEMOSTAT ABSORBABLE POWDER SURGICEL 3G (5EA/BX)

## (undated) DEVICE — SEAL 5MM-8MM UNIVERSAL  BOX OF 10

## (undated) DEVICE — DERMABOND ADVANCED - (12EA/BX)

## (undated) DEVICE — SUTURE 0 PDS CT-2 (36PK/BX)

## (undated) DEVICE — BLADE SURGICAL #15 - (50/BX 3BX/CA)